# Patient Record
Sex: MALE | Race: WHITE | NOT HISPANIC OR LATINO | Employment: FULL TIME | URBAN - METROPOLITAN AREA
[De-identification: names, ages, dates, MRNs, and addresses within clinical notes are randomized per-mention and may not be internally consistent; named-entity substitution may affect disease eponyms.]

---

## 2017-03-14 RX ORDER — PROPRANOLOL HYDROCHLORIDE 10 MG/1
TABLET ORAL
Qty: 90 TABLET | Refills: 0 | Status: SHIPPED | OUTPATIENT
Start: 2017-03-14 | End: 2018-01-10 | Stop reason: SDUPTHER

## 2018-01-10 ENCOUNTER — OFFICE VISIT (OUTPATIENT)
Dept: FAMILY MEDICINE CLINIC | Facility: CLINIC | Age: 48
End: 2018-01-10

## 2018-01-10 VITALS
HEIGHT: 74 IN | BODY MASS INDEX: 25.93 KG/M2 | RESPIRATION RATE: 16 BRPM | OXYGEN SATURATION: 99 % | DIASTOLIC BLOOD PRESSURE: 80 MMHG | WEIGHT: 202 LBS | SYSTOLIC BLOOD PRESSURE: 110 MMHG | HEART RATE: 66 BPM

## 2018-01-10 DIAGNOSIS — F43.0 ACUTE SITUATIONAL DISTURBANCE: ICD-10-CM

## 2018-01-10 DIAGNOSIS — Z79.899 LONG TERM USE OF DRUG: Primary | ICD-10-CM

## 2018-01-10 DIAGNOSIS — R59.0 AXILLARY ADENOPATHY: ICD-10-CM

## 2018-01-10 PROCEDURE — 99213 OFFICE O/P EST LOW 20 MIN: CPT | Performed by: INTERNAL MEDICINE

## 2018-01-10 RX ORDER — PROPRANOLOL HYDROCHLORIDE 10 MG/1
10 TABLET ORAL DAILY
Qty: 90 TABLET | Refills: 0 | Status: SHIPPED | OUTPATIENT
Start: 2018-01-10 | End: 2018-04-11 | Stop reason: SDUPTHER

## 2018-01-10 RX ORDER — CEPHALEXIN 500 MG/1
CAPSULE ORAL
Refills: 0 | COMMUNITY
Start: 2017-12-22 | End: 2018-08-10

## 2018-01-11 PROBLEM — R59.0 AXILLARY ADENOPATHY: Status: ACTIVE | Noted: 2018-01-11

## 2018-01-11 LAB
ALBUMIN SERPL-MCNC: 4.8 G/DL (ref 3.5–5.2)
ALBUMIN/GLOB SERPL: 1.8 G/DL
ALP SERPL-CCNC: 62 U/L (ref 39–117)
ALT SERPL-CCNC: 31 U/L (ref 1–41)
AST SERPL-CCNC: 24 U/L (ref 1–40)
BASOPHILS # BLD AUTO: 0.05 10*3/MM3 (ref 0–0.2)
BASOPHILS NFR BLD AUTO: 0.8 % (ref 0–1.5)
BILIRUB SERPL-MCNC: 1.8 MG/DL (ref 0.1–1.2)
BUN SERPL-MCNC: 16 MG/DL (ref 6–20)
BUN/CREAT SERPL: 16.8 (ref 7–25)
CALCIUM SERPL-MCNC: 9.3 MG/DL (ref 8.6–10.5)
CHLORIDE SERPL-SCNC: 102 MMOL/L (ref 98–107)
CO2 SERPL-SCNC: 25.8 MMOL/L (ref 22–29)
CREAT SERPL-MCNC: 0.95 MG/DL (ref 0.76–1.27)
EOSINOPHIL # BLD AUTO: 0.13 10*3/MM3 (ref 0–0.7)
EOSINOPHIL NFR BLD AUTO: 2.1 % (ref 0.3–6.2)
ERYTHROCYTE [DISTWIDTH] IN BLOOD BY AUTOMATED COUNT: 13.4 % (ref 11.5–14.5)
GLOBULIN SER CALC-MCNC: 2.7 GM/DL
GLUCOSE SERPL-MCNC: 88 MG/DL (ref 65–99)
HCT VFR BLD AUTO: 46.1 % (ref 40.4–52.2)
HGB BLD-MCNC: 16 G/DL (ref 13.7–17.6)
IMM GRANULOCYTES # BLD: 0.02 10*3/MM3 (ref 0–0.03)
IMM GRANULOCYTES NFR BLD: 0.3 % (ref 0–0.5)
LYMPHOCYTES # BLD AUTO: 2.29 10*3/MM3 (ref 0.9–4.8)
LYMPHOCYTES NFR BLD AUTO: 37.8 % (ref 19.6–45.3)
MCH RBC QN AUTO: 32.7 PG (ref 27–32.7)
MCHC RBC AUTO-ENTMCNC: 34.7 G/DL (ref 32.6–36.4)
MCV RBC AUTO: 94.1 FL (ref 79.8–96.2)
MONOCYTES # BLD AUTO: 0.6 10*3/MM3 (ref 0.2–1.2)
MONOCYTES NFR BLD AUTO: 9.9 % (ref 5–12)
NEUTROPHILS # BLD AUTO: 2.97 10*3/MM3 (ref 1.9–8.1)
NEUTROPHILS NFR BLD AUTO: 49.1 % (ref 42.7–76)
PLATELET # BLD AUTO: 212 10*3/MM3 (ref 140–500)
POTASSIUM SERPL-SCNC: 3.9 MMOL/L (ref 3.5–5.2)
PROT SERPL-MCNC: 7.5 G/DL (ref 6–8.5)
RBC # BLD AUTO: 4.9 10*6/MM3 (ref 4.6–6)
SODIUM SERPL-SCNC: 143 MMOL/L (ref 136–145)
WBC # BLD AUTO: 6.06 10*3/MM3 (ref 4.5–10.7)

## 2018-01-14 NOTE — PROGRESS NOTES
Subjective   Navin Mensah is a 47 y.o. male. Patient is here today for   Chief Complaint   Patient presents with   • Arm Problem     left   • Immunizations     tdap          Vitals:    01/10/18 1547   BP: 110/80   Pulse: 66   Resp: 16   SpO2: 99%       Past Medical History:   Diagnosis Date   • Anxiety       No Known Allergies   Social History     Social History   • Marital status: Unknown     Spouse name: N/A   • Number of children: N/A   • Years of education: N/A     Occupational History   • Not on file.     Social History Main Topics   • Smoking status: Never Smoker   • Smokeless tobacco: Not on file   • Alcohol use Yes      Comment: social   • Drug use: Not on file   • Sexual activity: Not on file     Other Topics Concern   • Not on file     Social History Narrative   • No narrative on file        Current Outpatient Prescriptions:   •  cephalexin (KEFLEX) 500 MG capsule, TAKE 1 CAPSULE BY MOUTH 4 TIMES A DAY, Disp: , Rfl: 0  •  propranolol (INDERAL) 10 MG tablet, Take 1 tablet by mouth Daily., Disp: 90 tablet, Rfl: 0     Objective     HPI Comments: I haven't seen this patient for about 3 years.  He is a friend of mine younger brothers.  He noticed that he developed some right anterior cervical adenopathy couple weeks ago.  This is since resolved.  At the same time, he developed some left axillary nodule.  Neither of these nodules were painful.  As I said, his anterior cervical node resolved but his left axillary node remains.  It is smaller than it was initially.    He notes no history of sore throat, ear drainage, sinus infection, scalp or skin lesion.    He does not smoke cigarettes.  He does not have a cough.    He has a history of situational anxiety.  He finds Inderal 10 mg once daily when necessary useful for this issue.    Arm Problem          Review of Systems   Constitutional: Negative.    HENT: Negative.    Respiratory: Negative.    Cardiovascular: Negative.    Psychiatric/Behavioral: Negative.         Physical Exam   Constitutional: He is oriented to person, place, and time. He appears well-developed and well-nourished.   HENT:   Head: Normocephalic and atraumatic.   Neck:   He has no adenopathy.   Cardiovascular: Normal rate and regular rhythm.    Pulmonary/Chest: Effort normal.   Musculoskeletal:   He does have a nontender, fixed, 5 mm subcutaneous lymph node in the left axilla   Neurological: He is alert and oriented to person, place, and time.   Psychiatric: He has a normal mood and affect. His behavior is normal.   Nursing note and vitals reviewed.        Problem List Items Addressed This Visit        Immune and Lymphatic    Axillary adenopathy    Relevant Orders    CT Chest With Contrast       Other    Acute situational disturbance      Other Visit Diagnoses     Long term use of drug    -  Primary    Relevant Orders    CBC w AUTO Differential (Completed)    Comprehensive metabolic panel (Completed)            PLAN  I refilled his Inderal which she takes as needed for acute situational anxiety.    He has an axillary adenopathy, and I would like to be thorough in evaluating.  He appears to be quite healthy but I think it should get a CT scan of his chest with contrast to be certain.  No Follow-up on file.

## 2018-01-31 ENCOUNTER — TELEPHONE (OUTPATIENT)
Dept: FAMILY MEDICINE CLINIC | Facility: CLINIC | Age: 48
End: 2018-01-31

## 2018-04-11 RX ORDER — PROPRANOLOL HYDROCHLORIDE 10 MG/1
10 TABLET ORAL DAILY
Qty: 90 TABLET | Refills: 0 | Status: SHIPPED | OUTPATIENT
Start: 2018-04-11 | End: 2020-02-27 | Stop reason: SDUPTHER

## 2018-08-10 ENCOUNTER — OFFICE VISIT (OUTPATIENT)
Dept: FAMILY MEDICINE CLINIC | Facility: CLINIC | Age: 48
End: 2018-08-10

## 2018-08-10 VITALS
SYSTOLIC BLOOD PRESSURE: 110 MMHG | DIASTOLIC BLOOD PRESSURE: 72 MMHG | TEMPERATURE: 99.2 F | HEIGHT: 74 IN | OXYGEN SATURATION: 98 % | HEART RATE: 65 BPM | WEIGHT: 199.6 LBS | RESPIRATION RATE: 16 BRPM | BODY MASS INDEX: 25.62 KG/M2

## 2018-08-10 DIAGNOSIS — M25.551 RIGHT HIP PAIN: Primary | ICD-10-CM

## 2018-08-10 PROCEDURE — 99213 OFFICE O/P EST LOW 20 MIN: CPT | Performed by: INTERNAL MEDICINE

## 2018-08-10 NOTE — PROGRESS NOTES
Subjective   Navin Mensah is a 48 y.o. male. Patient is here today for   Chief Complaint   Patient presents with   • Hip Pain     pt states right hip pain, states jerked the wrong way in back yard about two month ago and hurt right hip           Vitals:    08/10/18 1038   BP: 110/72   Pulse: 65   Resp: 16   Temp: 99.2 °F (37.3 °C)   SpO2: 98%       Past Medical History:   Diagnosis Date   • Anxiety       No Known Allergies   Social History     Social History   • Marital status: Unknown     Spouse name: N/A   • Number of children: N/A   • Years of education: N/A     Occupational History   • Not on file.     Social History Main Topics   • Smoking status: Never Smoker   • Smokeless tobacco: Never Used   • Alcohol use Yes      Comment: social   • Drug use: Unknown   • Sexual activity: Not on file     Other Topics Concern   • Not on file     Social History Narrative   • No narrative on file        Current Outpatient Prescriptions:   •  propranolol (INDERAL) 10 MG tablet, TAKE 1 TABLET BY MOUTH DAILY., Disp: 90 tablet, Rfl: 0     Objective     He slipped without falling couple months ago.  Since then, he has had some right hip pain.  He denies any having lumbar spine pain.  He denies any radicular pain.      Hip Pain           Review of Systems   Constitutional: Negative.    HENT: Negative.    Respiratory: Negative.    Cardiovascular: Negative.    Musculoskeletal: Negative.    Psychiatric/Behavioral: Negative.        Physical Exam   Constitutional: He is oriented to person, place, and time. He appears well-developed and well-nourished.   HENT:   Head: Normocephalic and atraumatic.   Pulmonary/Chest: Effort normal.   Musculoskeletal:   He had a negative straight leg raising.  He had no pain on palpation of his lumbar sacral spine.    There is no pain on flexion of the right hip for with internal or external rotation.  There is no crepitus.   Neurological: He is alert and oriented to person, place, and time.    Psychiatric: He has a normal mood and affect. His behavior is normal.   Nursing note and vitals reviewed.        Problem List Items Addressed This Visit        Nervous and Auditory    Right hip pain - Primary    Relevant Orders    Ambulatory Referral to Physical Therapy Evaluate and treat            PLAN  I will see physical therapy evaluation and management    3.  I've referred him to physical therapy.    If he fails to improve, he will let me know.  No Follow-up on file.

## 2018-08-28 ENCOUNTER — HOSPITAL ENCOUNTER (OUTPATIENT)
Dept: PHYSICAL THERAPY | Facility: HOSPITAL | Age: 48
Setting detail: THERAPIES SERIES
Discharge: HOME | End: 2018-08-28

## 2018-08-28 DIAGNOSIS — M25.551 RIGHT HIP PAIN: Primary | ICD-10-CM

## 2018-08-28 PROCEDURE — 97110 THERAPEUTIC EXERCISES: CPT

## 2018-08-28 PROCEDURE — G8978 MOBILITY CURRENT STATUS: HCPCS

## 2018-08-28 PROCEDURE — G8979 MOBILITY GOAL STATUS: HCPCS

## 2018-08-28 PROCEDURE — 97161 PT EVAL LOW COMPLEX 20 MIN: CPT

## 2018-08-28 NOTE — THERAPY EVALUATION
"    Outpatient Physical Therapy Ortho Initial Evaluation  [facility]     Patient Name: Navin Mensah  : 1970  MRN: [id number]  Today's Date: 2018      Visit Date: 2018    Patient Active Problem List   Diagnosis   • Acute situational disturbance   • Axillary adenopathy   • Right hip pain        Past Medical History:   Diagnosis Date   • Anxiety         History reviewed. No pertinent surgical history.    Visit Dx:     ICD-10-CM ICD-9-CM   1. Right hip pain M25.551 719.45             Patient History     Row Name 18 1400             History    Chief Complaint Pain  -LS      Type of Pain Hip pain  -LS      Date Current Problem(s) Began 18  -LS      Brief Description of Current Complaint Pt reports slipping on mossy patio and throwing R hip way out to balance. He reports \"near dislocation.\" He rides bike daily and handles that without difficulty. His greatest pain is lying on R hip, walking for long periods of time, and large movements to straddle bike.  -LS      Previous treatment for THIS PROBLEM Other (comment)   exercises  -LS      Patient/Caregiver Goals Relieve pain;Know what to do to help the symptoms  -LS      Occupation/sports/leisure activities Pt bikes for exercise. Pt works at a desk job.  -LS      Patient seeing anyone else for problem(s)? yes  -LS      How has patient tried to help current problem? some strengthening exercises  -LS      History of Previous Related Injuries pt denies prior hip injury  -LS         Pain     Pain Location Hip  -LS      Pain at Present 3  -LS      Pain at Best 0  -LS      Pain at Worst 3  -LS      Pain Frequency Intermittent  -LS      Pain Description Dull;Discomfort;Aching  -LS      What Performance Factors Make the Current Problem(s) WORSE? walking 2-3 miles, straddling bike initially, laying on R hip  -LS      What Performance Factors Make the Current Problem(s) BETTER? rest, sitting  -LS      Tolerance Time- Standing no issue  -LS      " Tolerance Time- Sitting no issue   -LS      Tolerance Time- Walking 2-3 miles  -LS      Tolerance Time- Lying pain laying on R  -LS      Is your sleep disturbed? No  -LS      What position do you sleep in? Supine;Left sidelying  -LS      Difficulties at work? Able to perform, seated desk job  -LS      Difficulties with ADL's? Able to perform  -LS      Difficulties with recreational activities? Pain with prolonged walking.  -LS         Fall Risk Assessment    Any falls in the past year: Yes  -LS         Services    Prior Rehab/Home Health Experiences No  -LS      Are you currently receiving Home Health services No  -LS      Do you plan to receive Home Health services in the near future No  -LS         Daily Activities    Primary Language English  -LS      Pt Participated in POC and Goals Yes  -LS         Safety    Are you being hurt, hit, or frightened by anyone at home or in your life? No  -LS      Are you being neglected by a caregiver No  -LS        User Key  (r) = Recorded By, (t) = Taken By, (c) = Cosigned By    Initials Name Provider Type    LS Paula Silvestre, PT Physical Therapist                PT Ortho     Row Name 08/28/18 1500       Quarter Clearing    Quarter Clearing --   BLE 5/5   -LS       Hip/Thigh Palpation    Greater Trochanter Right:;Tender  -LS    Gluteus Enzo Right:;Guarded/taut  -LS    Gluteus Medius Right:;Guarded/taut  -LS    Gluteus Minimus Right:;Guarded/taut  -LS       Hip Special Tests    JESSICA (hip vs SI pathology) Right:;Positive   reports slight discomfort in R hip  -LS    Nick’s test (tightness of ITB) Right:;Positive  -LS    Hip scour test (labral vs hip pathology) Negative  -LS    Piriformis test (piriformis syndrome) Negative  -LS       General ROM    GENERAL ROM COMMENTS BLE WFL  -LS       MMT (Manual Muscle Testing)    General MMT Comments R hip abduction/extension 4/5, R hip ER 4+/5  -LS       Sensation    Sensation WNL? WNL  -LS       Lower Extremity Flexibility    Hamstrings  Bilateral:;Mildly limited  -LS    Hip External Rotators Right:;Mildly limited  -LS    Hip Internal Rotators WNL  -LS    Gastrocnemius Bilateral:;Mildly limited  -LS       Gait/Stairs Assessment/Training    [place] Level (Gait) independent  -LS      User Key  (r) = Recorded By, (t) = Taken By, (c) = Cosigned By    Initials Name Provider Type    Paula Shelton, PT Physical Therapist                      Therapy Education  Education Details: issued HEP, discussed avoidance of long walks to point of pain, use of ice, intermittent stretching, post activity stretching, lateral strengthening   Given: HEP, Symptoms/condition management, Posture/body mechanics  Program: Reinforced  How Provided: Verbal, Demonstration, Written  Provided to: Patient  Level of Understanding: Teach back education performed, Verbalized, Demonstrated           PT OP Goals     Row Name 08/28/18 1500          PT Short Term Goals    STG Date to Achieve 09/11/18  -LS     STG 1 Pt will demonstrate understanding and compliance with initial HEP.  -LS     STG 1 Progress New  -LS     STG 2 Pt will report reduced long distance walking with reduction of aggravating R hip activities by 50%.  -LS     STG 2 Progress New  -LS        Long Term Goals    LTG Date to Achieve 09/27/18  -LS     LTG 1 Pt will demonstrate 5/5 R hip abduction, extension, ER strength.  -LS     LTG 1 Progress New  -LS     LTG 2 Pt will report full return to recreational activities without R hip discomfort.   -LS     LTG 2 Progress New  -LS        Time Calculation    PT Goal Re-Cert Due Date 11/26/18  -LS       User Key  (r) = Recorded By, (t) = Taken By, (c) = Cosigned By    Initials Name Provider Type    Paula Shelton, PT Physical Therapist                PT Assessment/Plan     Row Name 08/28/18 1514          PT Assessment    Functional Limitations Performance in leisure activities;Impaired gait;Limitations in functional capacity and performance;Impaired locomotion  -      Impairments Pain;Gait;Impaired flexibility  -LS     Assessment Comments Pt is 49 yo male with evolving R hip pain at lateral greater trochanter caused by slipping on patio resulting in awkward lateral movement without fall. He demonstrates slight deficits in R hip abduction (4/5) and external rotation (4+/5) strength. His R hip ROM is WFL. Tautness and trigger points noted in R piriformis and glute max. He has greatest pain with inc time spent walking and extreme combination movements of R hip. R hip scour testing negative and pt denies radicular symptoms. He demonstrates signs and symptoms consistent with R greater trochanter bursitis. His self reported LEFS disability score is 15% disability. Personal factors affecting his care include continued 10 miles biking daily and tendency to push through pain. He will benefit from continued skilled PT services to improve R hip flexibility and strengthen lateral and posterior hip musculature.  -LS     Please refer to paper survey for additional self-reported information Yes  -LS     Rehab Potential Good  -LS     Patient/caregiver participated in establishment of treatment plan and goals Yes  -LS     Patient would benefit from skilled therapy intervention Yes  -LS        PT Plan    PT Frequency 1x/week  -LS     Predicted Duration of Therapy Intervention (Therapy Eval) 3 weeks   -LS     Planned CPT's? PT EVAL LOW COMPLEXITY: 67604;PT THER PROC EA 15 MIN: 33741;PT RE-EVAL: 92798;PT THER ACT EA 15 MIN: 57681;PT MANUAL THERAPY EA 15 MIN: 05735;PT HOT OR COLD PACK TREAT MCARE;PT HOT/COLD PACK WC NONMCARE: 67823  -LS     PT Plan Comments Evaluate tolerance to HEP, progress as needed.   -LS       User Key  (r) = Recorded By, (t) = Taken By, (c) = Cosigned By    Initials Name Provider Type    Paula Shelton, PT Physical Therapist                  Exercises     Row Name 08/28/18 1500             Subjective Comments    Subjective Comments I feel it after I walk for 2-3 miles but  after I bike I have no issue.  -LS         Subjective Pain    Able to rate subjective pain? yes  -LS      Pre-Treatment Pain Level 3  -LS         Total Minutes    98750 - PT Therapeutic Exercise Minutes 15  -LS         Exercise 1    Exercise Name 1 piriformis stretch  -LS      Reps 1 3  -LS      Time 1 20  -LS         Exercise 2    Exercise Name 2 hip bridge  -LS      Reps 2 10  -LS      Time 2 3  -LS         Exercise 3    Exercise Name 3 SL hip abduction  -LS      Sets 3 2  -LS      Reps 3 10  -LS         Exercise 4    Exercise Name 4 SL clamshell  -LS      Sets 4 2  -LS      Reps 4 10  -LS         Exercise 5    Exercise Name 5 stair HS stretch  -LS      Reps 5 3  -LS      Time 5 20  -LS        User Key  (r) = Recorded By, (t) = Taken By, (c) = Cosigned By    Initials Name Provider Type    Paula Shelton, PT Physical Therapist                        Outcome Measure Options: Lower Extremity Functional Scale (LEFS)  Lower Extremity Functional Index  Any of your usual work, housework or school activities: No difficulty  Your usual hobbies, recreational or sporting activities: A little bit of difficulty  Getting into or out of the bath: No difficulty  Walking between rooms: No difficulty  Putting on your shoes or socks: No difficulty  Squatting: No difficulty  Lifting an object, like a bag of groceries from the floor: No difficulty  Performing light activities around your home: No difficulty  Performing heavy activities around your home: A little bit of difficulty  Getting into or out of a car: No difficulty  Walking 2 blocks: No difficulty  Walking a mile: A little bit of difficulty  Going up or down 10 stairs (about 1 flight of stairs): No difficulty  Standing for 1 hour: No difficulty  Sitting for 1 hour: No difficulty  Running on even ground: A little bit of difficulty  Running on uneven ground: A little bit of difficulty  Making sharp turns while running fast: A little bit of difficulty  Hopping: No  difficulty  Rolling over in bed: A little bit of difficulty  Total: 73      Time Calculation:     Therapy Suggested Charges     Code   Minutes Charges    79402 (CPT®) Hc Pt Neuromusc Re Education Ea 15 Min      49336 (CPT®) Hc Pt Ther Proc Ea 15 Min 15 1    85508 (CPT®) Hc Gait Training Ea 15 Min      69219 (CPT®) Hc Pt Therapeutic Act Ea 15 Min      49665 (CPT®) Hc Pt Manual Therapy Ea 15 Min      71171 (CPT®) Hc Pt Ther Massage- Per 15 Min      62204 (CPT®) Hc Pt Iontophoresis Ea 15 Min      52922 (CPT®) Hc Pt Elec Stim Ea-Per 15 Min      61518 (CPT®) Hc Pt Ultrasound Ea 15 Min      82607 (CPT®) Hc Pt Self Care/Mgmt/Train Ea 15 Min      75860 (CPT®) Hc Pt Prosthetic (S) Train Initial Encounter, Each 15 Min      04001 (CPT®) Hc Orthotic(S) Mgmt/Train Initial Encounter, Each 15min      32893 (CPT®) Hc Pt Aquatic Therapy Ea 15 Min      98069 (CPT®) Hc Pt Orthotic(S)/Prosthetic(S) Encounter, Each 15 Min      Total  15 1          Start Time: 1430  Stop Time: 1515  Time Calculation (min): 45 min  Total Timed Code Minutes- PT: 15 minute(s)     Therapy Charges for Today     Code Description Service Date Service Provider Modifiers Qty    86941233858 HC PT MOBILITY CURRENT 8/28/2018 Paula Silvestre, PT GP, CI 1    50440216070 HC PT MOBILITY PROJECTED 8/28/2018 Paula Silvestre, PT GP, CH 1    56221185214 HC PT THER PROC EA 15 MIN 8/28/2018 Paula Silvestre, PT GP 1    08856976064 HC PT EVAL LOW COMPLEXITY 2 8/28/2018 Paula Silvestre, PT GP 1          PT G-Codes  PT Professional Judgement Used?: Yes  Outcome Measure Options: Lower Extremity Functional Scale (LEFS)  Score: 8% disability   Functional Limitation: Mobility: Walking and moving around  Mobility: Walking and Moving Around Current Status (): At least 1 percent but less than 20 percent impaired, limited or restricted  Mobility: Walking and Moving Around Goal Status (): 0 percent impaired, limited or restricted         Paula Silvestre PT  8/28/2018

## 2018-08-31 DIAGNOSIS — Z00.00 ROUTINE HEALTH MAINTENANCE: Primary | ICD-10-CM

## 2018-09-10 ENCOUNTER — CLINICAL SUPPORT (OUTPATIENT)
Dept: FAMILY MEDICINE CLINIC | Facility: CLINIC | Age: 48
End: 2018-09-10

## 2018-09-10 DIAGNOSIS — Z00.00 ROUTINE HEALTH MAINTENANCE: Primary | ICD-10-CM

## 2018-09-10 LAB
ALBUMIN SERPL-MCNC: 4.9 G/DL (ref 3.5–5.2)
ALBUMIN/GLOB SERPL: 2 G/DL
ALP SERPL-CCNC: 60 U/L (ref 39–117)
ALT SERPL-CCNC: 27 U/L (ref 1–41)
APPEARANCE UR: CLEAR
AST SERPL-CCNC: 14 U/L (ref 1–40)
BILIRUB SERPL-MCNC: 1.1 MG/DL (ref 0.1–1.2)
BILIRUB UR QL STRIP: NEGATIVE
BUN SERPL-MCNC: 15 MG/DL (ref 6–20)
BUN/CREAT SERPL: 17.2 (ref 7–25)
CALCIUM SERPL-MCNC: 9.4 MG/DL (ref 8.6–10.5)
CHLORIDE SERPL-SCNC: 102 MMOL/L (ref 98–107)
CHOLEST SERPL-MCNC: 246 MG/DL (ref 0–200)
CO2 SERPL-SCNC: 28.2 MMOL/L (ref 22–29)
COLOR UR: YELLOW
CREAT SERPL-MCNC: 0.87 MG/DL (ref 0.76–1.27)
GLOBULIN SER CALC-MCNC: 2.4 GM/DL
GLUCOSE SERPL-MCNC: 91 MG/DL (ref 65–99)
GLUCOSE UR QL: NEGATIVE
HDLC SERPL-MCNC: 58 MG/DL (ref 40–60)
HGB UR QL STRIP: NEGATIVE
KETONES UR QL STRIP: NEGATIVE
LDLC SERPL CALC-MCNC: 155 MG/DL (ref 0–100)
LDLC/HDLC SERPL: 2.67 {RATIO}
LEUKOCYTE ESTERASE UR QL STRIP: NEGATIVE
NITRITE UR QL STRIP: NEGATIVE
PH UR STRIP: 6 [PH] (ref 5–8)
POTASSIUM SERPL-SCNC: 3.9 MMOL/L (ref 3.5–5.2)
PROT SERPL-MCNC: 7.3 G/DL (ref 6–8.5)
PROT UR QL STRIP: NEGATIVE
SODIUM SERPL-SCNC: 141 MMOL/L (ref 136–145)
SP GR UR: 1.01 (ref 1–1.03)
TRIGL SERPL-MCNC: 166 MG/DL (ref 0–150)
UROBILINOGEN UR STRIP-MCNC: NORMAL MG/DL
VLDLC SERPL CALC-MCNC: 33.2 MG/DL (ref 5–40)

## 2018-09-10 PROCEDURE — 85025 COMPLETE CBC W/AUTO DIFF WBC: CPT | Performed by: INTERNAL MEDICINE

## 2018-09-10 PROCEDURE — 71046 X-RAY EXAM CHEST 2 VIEWS: CPT | Performed by: INTERNAL MEDICINE

## 2018-09-10 PROCEDURE — 93000 ELECTROCARDIOGRAM COMPLETE: CPT | Performed by: INTERNAL MEDICINE

## 2018-09-21 ENCOUNTER — APPOINTMENT (OUTPATIENT)
Dept: PHYSICAL THERAPY | Facility: HOSPITAL | Age: 48
End: 2018-09-21

## 2018-10-15 ENCOUNTER — DOCUMENTATION (OUTPATIENT)
Dept: PHYSICAL THERAPY | Facility: HOSPITAL | Age: 48
End: 2018-10-15

## 2018-10-15 NOTE — THERAPY DISCHARGE NOTE
Outpatient Physical Therapy Discharge Summary         Patient Name: Navin Mensah  : 1970  MRN: [id number]    Today's Date: 10/15/2018    Visit Dx:  No diagnosis found.          PT OP Goals     Row Name 10/15/18 1500          PT Short Term Goals    STG Date to Achieve 18  -LS     STG 1 Pt will demonstrate understanding and compliance with initial HEP.  -LS     STG 1 Progress Not Met  -LS     STG 1 Progress Comments Pt did not return for follow up after evaluation. Unable to further assess.  -LS     STG 2 Pt will report reduced long distance walking with reduction of aggravating R hip activities by 50%.  -LS     STG 2 Progress Not Met  -LS     STG 2 Progress Comments Pt did not return for follow up after evaluation. Unable to further assess.  -LS        Long Term Goals    LTG Date to Achieve 18  -LS     LTG 1 Pt will demonstrate 5/5 R hip abduction, extension, ER strength.  -LS     LTG 1 Progress Not Met  -LS     LTG 1 Progress Comments Pt did not return for follow up after evaluation. Unable to further assess.  -LS     LTG 2 Pt will report full return to recreational activities without R hip discomfort.   -LS     LTG 2 Progress Not Met  -LS     LTG 2 Progress Comments Pt did not return for follow up after evaluation. Unable to further assess.  -LS       User Key  (r) = Recorded By, (t) = Taken By, (c) = Cosigned By    Initials Name Provider Type    Paula Shelton, PT Physical Therapist          OP PT Discharge Summary  Date of Discharge: 10/15/18  Reason for Discharge: Non-compliant  Outcomes Achieved: Patient able to partially acheive established goals  Discharge Destination: Home with home program  Discharge Instructions/Additional Comments: Pt did not return for follow up after evaluation. Unable to further assess.      Time Calculation:        Therapy Suggested Charges     Code   Minutes Charges    None                       Paula Silvestre PT  10/15/2018

## 2018-11-05 ENCOUNTER — OFFICE VISIT (OUTPATIENT)
Dept: FAMILY MEDICINE CLINIC | Facility: CLINIC | Age: 48
End: 2018-11-05

## 2018-11-05 VITALS
TEMPERATURE: 98.2 F | SYSTOLIC BLOOD PRESSURE: 110 MMHG | OXYGEN SATURATION: 99 % | DIASTOLIC BLOOD PRESSURE: 61 MMHG | BODY MASS INDEX: 25.57 KG/M2 | WEIGHT: 199.2 LBS | RESPIRATION RATE: 16 BRPM | HEIGHT: 74 IN | HEART RATE: 72 BPM

## 2018-11-05 DIAGNOSIS — Z00.00 ROUTINE HEALTH MAINTENANCE: Primary | ICD-10-CM

## 2018-11-05 DIAGNOSIS — Z23 NEED FOR VACCINATION: ICD-10-CM

## 2018-11-05 DIAGNOSIS — F43.0 ACUTE SITUATIONAL DISTURBANCE: ICD-10-CM

## 2018-11-05 PROCEDURE — 71046 X-RAY EXAM CHEST 2 VIEWS: CPT | Performed by: INTERNAL MEDICINE

## 2018-11-05 PROCEDURE — 90471 IMMUNIZATION ADMIN: CPT | Performed by: INTERNAL MEDICINE

## 2018-11-05 PROCEDURE — 99396 PREV VISIT EST AGE 40-64: CPT | Performed by: INTERNAL MEDICINE

## 2018-11-05 PROCEDURE — 90715 TDAP VACCINE 7 YRS/> IM: CPT | Performed by: INTERNAL MEDICINE

## 2018-11-18 PROBLEM — Z00.00 ROUTINE HEALTH MAINTENANCE: Status: ACTIVE | Noted: 2018-11-18

## 2018-11-18 NOTE — PROGRESS NOTES
Subjective   Navin Mensah is a 48 y.o. male. Patient is here today for   Chief Complaint   Patient presents with   • Annual Exam     CPE           Vitals:    11/05/18 1101   BP: 110/61   Pulse: 72   Resp: 16   Temp: 98.2 °F (36.8 °C)   SpO2: 99%       The following portions of the patient's history were reviewed and updated as appropriate: allergies, current medications, past family history, past medical history, past social history, past surgical history and problem list.    Past Medical History:   Diagnosis Date   • Anxiety       No Known Allergies   Social History     Socioeconomic History   • Marital status: Unknown     Spouse name: Not on file   • Number of children: Not on file   • Years of education: Not on file   • Highest education level: Not on file   Social Needs   • Financial resource strain: Not on file   • Food insecurity - worry: Not on file   • Food insecurity - inability: Not on file   • Transportation needs - medical: Not on file   • Transportation needs - non-medical: Not on file   Occupational History   • Not on file   Tobacco Use   • Smoking status: Never Smoker   • Smokeless tobacco: Never Used   Substance and Sexual Activity   • Alcohol use: Yes     Comment: social   • Drug use: Not on file   • Sexual activity: Not on file   Other Topics Concern   • Not on file   Social History Narrative   • Not on file        Current Outpatient Medications:   •  propranolol (INDERAL) 10 MG tablet, TAKE 1 TABLET BY MOUTH DAILY., Disp: 90 tablet, Rfl: 0     EKG  ECG Report  Electrocardiogram September 10, 2018 showed normal sinus rhythm.  This was a normal EKG.    PA and lateral chest x-ray and same date showed no acute or chronic changes.  Objective   This pleasant 48-year-old male is here for a comprehensive physical exam.    He has no complaints.       Navin Mensah 48 y.o. male who presents for an Annual Wellness Visit.  he has a history of   Patient Active Problem List   Diagnosis   • Acute situational  disturbance   • Axillary adenopathy   • Right hip pain   • Routine health maintenance   .  he has been doing well with new interval problems.  Labs results discussed in detail with the patient.  Plan to update vaccines if needed today.        Lab Results (most recent)     None            Review of Systems   Constitutional: Negative.    HENT: Negative.    Eyes: Negative.    Respiratory: Negative.    Cardiovascular: Negative.    Gastrointestinal: Negative.    Endocrine: Negative.    Genitourinary: Negative.    Musculoskeletal: Negative.    Skin: Negative.    Allergic/Immunologic: Negative.    Neurological: Negative.    Hematological: Negative.    Psychiatric/Behavioral: Negative.        Physical Exam   Constitutional: He is oriented to person, place, and time. He appears well-developed and well-nourished. No distress.   Pleasant, neatly groomed, BMI 25.   HENT:   Head: Normocephalic and atraumatic.   Right Ear: External ear normal.   Left Ear: External ear normal.   Nose: Nose normal.   Mouth/Throat: Oropharynx is clear and moist. No oropharyngeal exudate.   Eyes: Conjunctivae and EOM are normal. Pupils are equal, round, and reactive to light. Right eye exhibits no discharge. Left eye exhibits no discharge. No scleral icterus.   Neck: Normal range of motion. Neck supple. No JVD present. No tracheal deviation present. No thyromegaly present.   Cardiovascular: Normal rate, regular rhythm, normal heart sounds and intact distal pulses. Exam reveals no gallop and no friction rub.   No murmur heard.  Pulmonary/Chest: Effort normal and breath sounds normal. No stridor. No respiratory distress. He has no wheezes. He has no rales. He exhibits no tenderness.   Abdominal: Soft. Bowel sounds are normal. He exhibits no distension and no mass. There is no tenderness. There is no rebound and no guarding. No hernia.   Genitourinary: Rectum normal, prostate normal and penis normal. Rectal exam shows guaiac negative stool. No penile  tenderness.   Musculoskeletal: Normal range of motion. He exhibits no edema, tenderness or deformity.   Lymphadenopathy:     He has no cervical adenopathy.   Neurological: He is alert and oriented to person, place, and time. He displays normal reflexes. No cranial nerve deficit or sensory deficit. He exhibits normal muscle tone. Coordination normal.   Skin: Skin is warm and dry. Capillary refill takes less than 2 seconds. No rash noted. He is not diaphoretic. No erythema. No pallor.   Psychiatric: He has a normal mood and affect. His behavior is normal. Judgment and thought content normal.   Nursing note and vitals reviewed.      ASSESSMENT       Problem List Items Addressed This Visit        Other    Acute situational disturbance    Routine health maintenance - Primary    Relevant Orders    XR Chest PA & Lateral (Completed)      Other Visit Diagnoses     Need for vaccination        Relevant Orders    Tdap Vaccine Greater Than or Equal To 6yo IM (Completed)          PLAN  He has been propranolol to be useful for situational anxiety refilled that medication today.    Overall he is very fit.    His cholesterols bit elevated.  His LDL cholesterol really ought to be less than 1:30 given his level of risk.  I recommended that he bruises [email or web address] website and follow advice on lower cholesterol that he may find that site.    Unlike him back in about a year to recheck his labs.  Also, he will be due for a comprehensive physical exam at that time.  There are no Patient Instructions on file for this visit.    No Follow-up on file.

## 2020-02-10 RX ORDER — PROPRANOLOL HYDROCHLORIDE 10 MG/1
10 TABLET ORAL DAILY
Qty: 90 TABLET | Refills: 0 | OUTPATIENT
Start: 2020-02-10

## 2020-02-27 ENCOUNTER — OFFICE VISIT (OUTPATIENT)
Dept: FAMILY MEDICINE CLINIC | Facility: CLINIC | Age: 50
End: 2020-02-27

## 2020-02-27 VITALS
SYSTOLIC BLOOD PRESSURE: 104 MMHG | HEIGHT: 74 IN | WEIGHT: 204.4 LBS | TEMPERATURE: 97.5 F | BODY MASS INDEX: 26.23 KG/M2 | HEART RATE: 69 BPM | DIASTOLIC BLOOD PRESSURE: 66 MMHG | OXYGEN SATURATION: 98 %

## 2020-02-27 DIAGNOSIS — F41.8 SITUATIONAL ANXIETY: Primary | ICD-10-CM

## 2020-02-27 PROCEDURE — 99213 OFFICE O/P EST LOW 20 MIN: CPT | Performed by: INTERNAL MEDICINE

## 2020-02-27 RX ORDER — PROPRANOLOL HYDROCHLORIDE 10 MG/1
10 TABLET ORAL DAILY
Qty: 90 TABLET | Refills: 1 | Status: SHIPPED | OUTPATIENT
Start: 2020-02-27

## 2020-03-08 PROBLEM — F41.8 SITUATIONAL ANXIETY: Status: ACTIVE | Noted: 2020-03-08

## 2020-03-08 NOTE — PROGRESS NOTES
Subjective   Navin Mensah is a 49 y.o. male. Patient is here today for   Chief Complaint   Patient presents with   • Anxiety          Vitals:    02/27/20 1510   BP: 104/66   Pulse: 69   Temp: 97.5 °F (36.4 °C)   SpO2: 98%     Body mass index is 26.24 kg/m².      Past Medical History:   Diagnosis Date   • Anxiety       No Known Allergies   Social History     Socioeconomic History   • Marital status: Unknown     Spouse name: Not on file   • Number of children: Not on file   • Years of education: Not on file   • Highest education level: Not on file   Tobacco Use   • Smoking status: Never Smoker   • Smokeless tobacco: Never Used   Substance and Sexual Activity   • Alcohol use: Yes     Comment: social        Current Outpatient Medications:   •  propranolol (INDERAL) 10 MG tablet, Take 1 tablet by mouth Daily., Disp: 90 tablet, Rfl: 1     Objective     This pleasant patient has situational anxiety.  Propranolol works well to alleviate this symptom when needed.  He is here to get a prescription for propranolol.    Otherwise, no complaints.       Review of Systems   Constitutional: Negative.    HENT: Negative.    Respiratory: Negative.    Cardiovascular: Negative.    Musculoskeletal: Negative.    Psychiatric/Behavioral:        He has situational anxiety his symptoms are largely alleviated with the use of propranolol as needed.       Physical Exam   Constitutional: He is oriented to person, place, and time. He appears well-developed and well-nourished.   Pleasant, neatly groomed, BMI 26.   HENT:   Head: Normocephalic and atraumatic.   Cardiovascular: Normal rate.   Pulmonary/Chest: Effort normal and breath sounds normal.   Neurological: He is alert and oriented to person, place, and time.   Psychiatric: He has a normal mood and affect. His behavior is normal. Thought content normal.   Nursing note and vitals reviewed.        Problem List Items Addressed This Visit        Other    Situational anxiety - Primary             PLAN  I refilled his propranolol.    I asked him to follow-up for a comprehensive physical examination once yearly.  No follow-ups on file.

## 2020-11-10 NOTE — TELEPHONE ENCOUNTER
I called patient and notified him, per Dr. Brown, that his labs were normal.   Patient understood.         ----- Message from Harmony Wagner sent at 1/31/2018 10:20 AM EST -----  WANTED TO KNOW WHAT HIS LAB RESULTS WERE. HE SAID HE WAS SUPPOSED TO GET A CALL.    PT# [phone]     pre op: palpable R DP pulse, biphasic PT signal. Palpable L PT pulse, triphasic DP signal.

## 2021-03-04 DIAGNOSIS — Z12.5 ENCOUNTER FOR SCREENING FOR MALIGNANT NEOPLASM OF PROSTATE: ICD-10-CM

## 2021-03-04 DIAGNOSIS — Z13.1 ENCOUNTER FOR SCREENING FOR DIABETES MELLITUS: ICD-10-CM

## 2021-03-04 DIAGNOSIS — Z13.89 ENCOUNTER FOR SCREENING FOR OTHER DISORDER: Primary | ICD-10-CM

## 2021-03-04 DIAGNOSIS — Z13.220 ENCOUNTER FOR SCREENING FOR LIPID DISORDER: ICD-10-CM

## 2021-03-04 DIAGNOSIS — Z13.228 ENCOUNTER FOR SCREENING FOR METABOLIC DISORDER: ICD-10-CM

## 2021-03-12 ENCOUNTER — IMMUNIZATION (OUTPATIENT)
Dept: VACCINE CLINIC | Facility: HOSPITAL | Age: 51
End: 2021-03-12

## 2021-03-12 PROCEDURE — 91300 HC SARSCOV02 VAC 30MCG/0.3ML IM: CPT | Performed by: INTERNAL MEDICINE

## 2021-03-12 PROCEDURE — 0001A: CPT | Performed by: INTERNAL MEDICINE

## 2021-03-25 ENCOUNTER — TELEPHONE (OUTPATIENT)
Dept: FAMILY MEDICINE CLINIC | Facility: CLINIC | Age: 51
End: 2021-03-25

## 2021-03-25 NOTE — TELEPHONE ENCOUNTER
DELETE AFTER REVIEWING: Telephone encounter to be sent to the clinical pool     Caller: Navin Mensah III    Relationship to patient: Self    Best call back number: [phone]     Date of exposure:     Date of positive COVID19 test:03/25  Date if possible COVID19 exposure:     COVID19 symptoms: FEVER, RAW THROAT , FEVER IS BETTER TODAY     Date of initial quarantine:    Additional information or concerns:  What is the patients preferred pharmacy: WOULD LIKE TO KNOW IF THERE IS ANYTHING HE SHOULD BE DOING EVEN THOUGH HIS SYMPTOMS ARE FEW.

## 2021-03-25 NOTE — TELEPHONE ENCOUNTER
Date of positive COVID19 test:03/25  Date if possible COVID19 exposure:      COVID19 symptoms: FEVER, RAW THROAT , FEVER IS BETTER TODAY      PATIENT WOULD LIKE TO KNOW IF THERE IS ANYTHING HE SHOULD BE DOING EVEN THOUGH HIS SYMPTOMS ARE FEW.       LAST SEEN 2/27/21 UPCOMING 4/9/21

## 2021-03-25 NOTE — TELEPHONE ENCOUNTER
I INFORMED PATIENT THERE IS NO MEDICINE FOR COVID HE NEEDS TO REST USE TYLENOL OR IBUPROFEN WARM SALT WATER AND DRINK PLENTY OF WATER.

## 2021-03-31 ENCOUNTER — TELEPHONE (OUTPATIENT)
Dept: FAMILY MEDICINE CLINIC | Facility: CLINIC | Age: 51
End: 2021-03-31

## 2021-03-31 NOTE — TELEPHONE ENCOUNTER
PATIENT CALLED IN WANTING TO KNOW IF HE SHOULD HE COME TO HIS APPT, HE SAYS IT HAS BEEN TEN DAYS SINCE DIAGNOSED WITH THE COVID. PATIENT ALSO WANTS TO KNOW IF HE SHOULD GET THE SECOND DOSE OF THE COVID VACCINE.    BEST CALL BACK # [phone]

## 2021-04-02 ENCOUNTER — APPOINTMENT (OUTPATIENT)
Dept: VACCINE CLINIC | Facility: HOSPITAL | Age: 51
End: 2021-04-02

## 2021-04-15 LAB
ALBUMIN SERPL-MCNC: 4.4 G/DL (ref 3.5–5.2)
ALBUMIN/GLOB SERPL: 2.1 G/DL
ALP SERPL-CCNC: 56 U/L (ref 39–117)
ALT SERPL-CCNC: 18 U/L (ref 1–41)
APPEARANCE UR: CLEAR
AST SERPL-CCNC: 16 U/L (ref 1–40)
BACTERIA #/AREA URNS HPF: NORMAL /HPF
BASOPHILS # BLD AUTO: 0.04 10*3/MM3 (ref 0–0.2)
BASOPHILS NFR BLD AUTO: 0.7 % (ref 0–1.5)
BILIRUB SERPL-MCNC: 1.1 MG/DL (ref 0–1.2)
BILIRUB UR QL STRIP: NEGATIVE
BUN SERPL-MCNC: 14 MG/DL (ref 6–20)
BUN/CREAT SERPL: 17.1 (ref 7–25)
CALCIUM SERPL-MCNC: 9.2 MG/DL (ref 8.6–10.5)
CASTS URNS MICRO: NORMAL
CHLORIDE SERPL-SCNC: 105 MMOL/L (ref 98–107)
CHOLEST SERPL-MCNC: 186 MG/DL (ref 0–200)
CO2 SERPL-SCNC: 29.8 MMOL/L (ref 22–29)
COLOR UR: YELLOW
CREAT SERPL-MCNC: 0.82 MG/DL (ref 0.76–1.27)
EOSINOPHIL # BLD AUTO: 0.08 10*3/MM3 (ref 0–0.4)
EOSINOPHIL NFR BLD AUTO: 1.5 % (ref 0.3–6.2)
EPI CELLS #/AREA URNS HPF: NORMAL /HPF
ERYTHROCYTE [DISTWIDTH] IN BLOOD BY AUTOMATED COUNT: 13.3 % (ref 12.3–15.4)
GLOBULIN SER CALC-MCNC: 2.1 GM/DL
GLUCOSE SERPL-MCNC: 88 MG/DL (ref 65–99)
GLUCOSE UR QL: NEGATIVE
HBA1C MFR BLD: 5 % (ref 4.8–5.6)
HCT VFR BLD AUTO: 41.6 % (ref 37.5–51)
HDLC SERPL-MCNC: 48 MG/DL (ref 40–60)
HGB BLD-MCNC: 14.9 G/DL (ref 13–17.7)
HGB UR QL STRIP: NEGATIVE
IMM GRANULOCYTES # BLD AUTO: 0.02 10*3/MM3 (ref 0–0.05)
IMM GRANULOCYTES NFR BLD AUTO: 0.4 % (ref 0–0.5)
KETONES UR QL STRIP: NEGATIVE
LDLC SERPL CALC-MCNC: 117 MG/DL (ref 0–100)
LDLC/HDLC SERPL: 2.39 {RATIO}
LEUKOCYTE ESTERASE UR QL STRIP: NEGATIVE
LYMPHOCYTES # BLD AUTO: 1.83 10*3/MM3 (ref 0.7–3.1)
LYMPHOCYTES NFR BLD AUTO: 33.7 % (ref 19.6–45.3)
MCH RBC QN AUTO: 33 PG (ref 26.6–33)
MCHC RBC AUTO-ENTMCNC: 35.8 G/DL (ref 31.5–35.7)
MCV RBC AUTO: 92.2 FL (ref 79–97)
MONOCYTES # BLD AUTO: 0.5 10*3/MM3 (ref 0.1–0.9)
MONOCYTES NFR BLD AUTO: 9.2 % (ref 5–12)
NEUTROPHILS # BLD AUTO: 2.96 10*3/MM3 (ref 1.7–7)
NEUTROPHILS NFR BLD AUTO: 54.5 % (ref 42.7–76)
NITRITE UR QL STRIP: NEGATIVE
NRBC BLD AUTO-RTO: 0 /100 WBC (ref 0–0.2)
PH UR STRIP: 6.5 [PH] (ref 5–8)
PLATELET # BLD AUTO: 213 10*3/MM3 (ref 140–450)
POTASSIUM SERPL-SCNC: 3.9 MMOL/L (ref 3.5–5.2)
PROT SERPL-MCNC: 6.5 G/DL (ref 6–8.5)
PROT UR QL STRIP: NEGATIVE
PSA SERPL-MCNC: 0.43 NG/ML (ref 0–4)
RBC # BLD AUTO: 4.51 10*6/MM3 (ref 4.14–5.8)
RBC #/AREA URNS HPF: NORMAL /HPF
SODIUM SERPL-SCNC: 141 MMOL/L (ref 136–145)
SP GR UR: 1.01 (ref 1–1.03)
TRIGL SERPL-MCNC: 117 MG/DL (ref 0–150)
TSH SERPL DL<=0.005 MIU/L-ACNC: 1.24 UIU/ML (ref 0.27–4.2)
UROBILINOGEN UR STRIP-MCNC: NORMAL MG/DL
VLDLC SERPL CALC-MCNC: 21 MG/DL (ref 5–40)
WBC # BLD AUTO: 5.43 10*3/MM3 (ref 3.4–10.8)
WBC #/AREA URNS HPF: NORMAL /HPF

## 2021-04-20 ENCOUNTER — IMMUNIZATION (OUTPATIENT)
Dept: VACCINE CLINIC | Facility: HOSPITAL | Age: 51
End: 2021-04-20

## 2021-04-20 ENCOUNTER — APPOINTMENT (OUTPATIENT)
Dept: VACCINE CLINIC | Facility: HOSPITAL | Age: 51
End: 2021-04-20

## 2021-04-20 PROCEDURE — 91300 HC SARSCOV02 VAC 30MCG/0.3ML IM: CPT | Performed by: INTERNAL MEDICINE

## 2021-04-20 PROCEDURE — 0002A: CPT | Performed by: INTERNAL MEDICINE

## 2021-04-23 ENCOUNTER — OFFICE VISIT (OUTPATIENT)
Dept: FAMILY MEDICINE CLINIC | Facility: CLINIC | Age: 51
End: 2021-04-23

## 2021-04-23 VITALS
SYSTOLIC BLOOD PRESSURE: 110 MMHG | HEART RATE: 60 BPM | TEMPERATURE: 97.5 F | DIASTOLIC BLOOD PRESSURE: 64 MMHG | WEIGHT: 197 LBS | BODY MASS INDEX: 25.28 KG/M2 | OXYGEN SATURATION: 99 % | HEIGHT: 74 IN | RESPIRATION RATE: 18 BRPM

## 2021-04-23 DIAGNOSIS — F41.8 SITUATIONAL ANXIETY: ICD-10-CM

## 2021-04-23 DIAGNOSIS — Z00.00 ROUTINE HEALTH MAINTENANCE: ICD-10-CM

## 2021-04-23 DIAGNOSIS — Z12.11 COLON CANCER SCREENING: Primary | ICD-10-CM

## 2021-04-23 PROCEDURE — 99396 PREV VISIT EST AGE 40-64: CPT | Performed by: INTERNAL MEDICINE

## 2021-04-23 NOTE — PROGRESS NOTES
Subjective   Navin Mensah is a 51 y.o. male. Patient is here today for   Chief Complaint   Patient presents with   • Annual Exam          Vitals:    04/23/21 1056   BP: 110/64   Pulse: 60   Resp: 18   Temp: 97.5 °F (36.4 °C)   SpO2: 99%     Body mass index is 25.29 kg/m².      Past Medical History:   Diagnosis Date   • Anxiety       No Known Allergies   Social History     Socioeconomic History   • Marital status:      Spouse name: Not on file   • Number of children: Not on file   • Years of education: Not on file   • Highest education level: Not on file   Tobacco Use   • Smoking status: Never Smoker   • Smokeless tobacco: Never Used   Vaping Use   • Vaping Use: Never used   Substance and Sexual Activity   • Alcohol use: Yes     Alcohol/week: 2.0 standard drinks     Types: 2 Cans of beer per week     Comment: social   • Drug use: Never        Current Outpatient Medications:   •  propranolol (INDERAL) 10 MG tablet, Take 1 tablet by mouth Daily., Disp: 90 tablet, Rfl: 1     Objective     He is here for a comprehensive physical exam.    He actually has no complaints.    He tells me that he feels well.  His wife is seeing a counselor and he thought he might begin to see a counselor to.  With his past Covid pandemic year he has occasionally had some difficulty with social isolation and anxiety.    Work is going well for him.  His 15-year-old son's health is good.  He and his wife are getting along well.       Review of Systems   Constitutional: Negative.    HENT: Negative.    Eyes: Negative.    Respiratory: Negative.    Cardiovascular: Negative.    Gastrointestinal: Negative.    Endocrine: Negative.    Genitourinary: Negative.    Musculoskeletal: Negative.    Skin: Negative.    Allergic/Immunologic: Negative.    Neurological: Negative.    Hematological: Negative.    Psychiatric/Behavioral: Negative.        Physical Exam  Vitals and nursing note reviewed.   Constitutional:       General: He is not in acute  distress.     Appearance: Normal appearance. He is not ill-appearing, toxic-appearing or diaphoretic.      Comments: Pleasant, neatly groomed, no distress.   HENT:      Head: Normocephalic and atraumatic.      Right Ear: Tympanic membrane, ear canal and external ear normal. There is no impacted cerumen.      Left Ear: Tympanic membrane, ear canal and external ear normal. There is no impacted cerumen.   Eyes:      General: No scleral icterus.        Right eye: No discharge.         Left eye: No discharge.      Extraocular Movements: Extraocular movements intact.      Conjunctiva/sclera: Conjunctivae normal.   Neck:      Vascular: No carotid bruit.   Cardiovascular:      Rate and Rhythm: Normal rate and regular rhythm.      Pulses: Normal pulses.      Heart sounds: Normal heart sounds. No murmur heard.   No gallop.    Pulmonary:      Effort: No respiratory distress.      Breath sounds: No stridor. No wheezing, rhonchi or rales.   Chest:      Chest wall: No tenderness.   Abdominal:      General: There is no distension.      Palpations: There is no mass.      Tenderness: There is no abdominal tenderness. There is no right CVA tenderness, left CVA tenderness, guarding or rebound.      Hernia: No hernia is present.   Genitourinary:     Penis: Normal.       Testes: Normal.      Prostate: Normal.      Rectum: Normal.   Musculoskeletal:         General: No swelling, tenderness, deformity or signs of injury. Normal range of motion.      Cervical back: Normal range of motion and neck supple. No rigidity or tenderness.      Right lower leg: No edema.      Left lower leg: No edema.   Lymphadenopathy:      Cervical: No cervical adenopathy.   Skin:     Capillary Refill: Capillary refill takes less than 2 seconds.      Coloration: Skin is not jaundiced or pale.      Findings: No bruising, erythema, lesion or rash.   Neurological:      General: No focal deficit present.      Mental Status: He is alert and oriented to person, place,  and time.      Cranial Nerves: No cranial nerve deficit.      Motor: No weakness.      Coordination: Coordination normal.      Gait: Gait normal.      Deep Tendon Reflexes: Reflexes normal.   Psychiatric:         Mood and Affect: Mood normal.         Behavior: Behavior normal.         Thought Content: Thought content normal.       Neither an EKG nor a PA and lateral chest x-ray were done today.  They were not indicated.    Problems Addressed this Visit        Health Encounters    Routine health maintenance       Mental Health    Situational anxiety      Other Visit Diagnoses     Colon cancer screening    -  Primary    Relevant Orders    Cologuard - Stool, Per Rectum      Diagnoses       Codes Comments    Colon cancer screening    -  Primary ICD-10-CM: Z12.11  ICD-9-CM: V76.51     Routine health maintenance     ICD-10-CM: Z00.00  ICD-9-CM: V70.0     Situational anxiety     ICD-10-CM: F41.8  ICD-9-CM: 300.09             PLAN  He and I reviewed his labs.  His cholesterol a couple years ago was actually too high and now it falls into the normal range.    He is exercising regularly 30 minutes to 40 minutes a day 5 times or more week with a stationary bike.    He takes Inderal 10 mg daily as needed situational anxiety this works out well for him.    Weight is appropriate, his blood pressure is normal.    I have arranged for him to get a Cologuard for colon cancer screening.    I would like to see him back in a year.  No follow-ups on file.

## 2021-07-20 ENCOUNTER — OFFICE VISIT (OUTPATIENT)
Dept: ORTHOPEDIC SURGERY | Facility: CLINIC | Age: 51
End: 2021-07-20

## 2021-07-20 VITALS — WEIGHT: 199.6 LBS | HEIGHT: 74 IN | BODY MASS INDEX: 25.62 KG/M2 | TEMPERATURE: 97.5 F

## 2021-07-20 DIAGNOSIS — R52 PAIN: Primary | ICD-10-CM

## 2021-07-20 DIAGNOSIS — M25.551 RIGHT HIP PAIN: ICD-10-CM

## 2021-07-20 PROCEDURE — 99203 OFFICE O/P NEW LOW 30 MIN: CPT | Performed by: NURSE PRACTITIONER

## 2021-07-20 PROCEDURE — 73501 X-RAY EXAM HIP UNI 1 VIEW: CPT | Performed by: NURSE PRACTITIONER

## 2021-08-13 RX ORDER — MELOXICAM 15 MG/1
TABLET ORAL
Qty: 30 TABLET | Refills: 1 | Status: SHIPPED | OUTPATIENT
Start: 2021-08-13 | End: 2022-02-14

## 2022-02-14 RX ORDER — MELOXICAM 15 MG/1
TABLET ORAL
Qty: 30 TABLET | Refills: 1 | Status: SHIPPED | OUTPATIENT
Start: 2022-02-14

## 2022-02-14 NOTE — TELEPHONE ENCOUNTER
Please notify the patient that I will refill this prescription, however if he is going to be on this medication for long-term he needs to get it filled by his family doctor going forward.  He needs routine lab work every 6 months while on chronic NSAIDs.

## 2023-10-17 ENCOUNTER — EMERGENCY (EMERGENCY)
Facility: HOSPITAL | Age: 53
LOS: 1 days | Discharge: HOME | End: 2023-10-17
Attending: EMERGENCY MEDICINE | Admitting: EMERGENCY MEDICINE
Payer: COMMERCIAL

## 2023-10-17 VITALS
DIASTOLIC BLOOD PRESSURE: 67 MMHG | TEMPERATURE: 98 F | HEIGHT: 73 IN | HEART RATE: 76 BPM | SYSTOLIC BLOOD PRESSURE: 116 MMHG | OXYGEN SATURATION: 97 % | RESPIRATION RATE: 16 BRPM | WEIGHT: 205.03 LBS

## 2023-10-17 DIAGNOSIS — R07.89 OTHER CHEST PAIN: ICD-10-CM

## 2023-10-17 DIAGNOSIS — F41.9 ANXIETY DISORDER, UNSPECIFIED: ICD-10-CM

## 2023-10-17 LAB
ANION GAP SERPL CALC-SCNC: 12 MMOL/L — SIGNIFICANT CHANGE UP (ref 5–17)
ANION GAP SERPL CALC-SCNC: 12 MMOL/L — SIGNIFICANT CHANGE UP (ref 5–17)
BASOPHILS # BLD AUTO: 0.05 K/UL — SIGNIFICANT CHANGE UP (ref 0–0.2)
BASOPHILS # BLD AUTO: 0.05 K/UL — SIGNIFICANT CHANGE UP (ref 0–0.2)
BASOPHILS NFR BLD AUTO: 0.8 % — SIGNIFICANT CHANGE UP (ref 0–2)
BASOPHILS NFR BLD AUTO: 0.8 % — SIGNIFICANT CHANGE UP (ref 0–2)
BUN SERPL-MCNC: 15 MG/DL — SIGNIFICANT CHANGE UP (ref 7–23)
BUN SERPL-MCNC: 15 MG/DL — SIGNIFICANT CHANGE UP (ref 7–23)
CALCIUM SERPL-MCNC: 9.1 MG/DL — SIGNIFICANT CHANGE UP (ref 8.4–10.5)
CALCIUM SERPL-MCNC: 9.1 MG/DL — SIGNIFICANT CHANGE UP (ref 8.4–10.5)
CHLORIDE SERPL-SCNC: 104 MMOL/L — SIGNIFICANT CHANGE UP (ref 96–108)
CHLORIDE SERPL-SCNC: 104 MMOL/L — SIGNIFICANT CHANGE UP (ref 96–108)
CO2 SERPL-SCNC: 25 MMOL/L — SIGNIFICANT CHANGE UP (ref 22–31)
CO2 SERPL-SCNC: 25 MMOL/L — SIGNIFICANT CHANGE UP (ref 22–31)
CREAT SERPL-MCNC: 1 MG/DL — SIGNIFICANT CHANGE UP (ref 0.5–1.3)
CREAT SERPL-MCNC: 1 MG/DL — SIGNIFICANT CHANGE UP (ref 0.5–1.3)
EGFR: 91 ML/MIN/1.73M2 — SIGNIFICANT CHANGE UP
EGFR: 91 ML/MIN/1.73M2 — SIGNIFICANT CHANGE UP
EOSINOPHIL # BLD AUTO: 0.07 K/UL — SIGNIFICANT CHANGE UP (ref 0–0.5)
EOSINOPHIL # BLD AUTO: 0.07 K/UL — SIGNIFICANT CHANGE UP (ref 0–0.5)
EOSINOPHIL NFR BLD AUTO: 1.1 % — SIGNIFICANT CHANGE UP (ref 0–6)
EOSINOPHIL NFR BLD AUTO: 1.1 % — SIGNIFICANT CHANGE UP (ref 0–6)
GLUCOSE SERPL-MCNC: 87 MG/DL — SIGNIFICANT CHANGE UP (ref 70–99)
GLUCOSE SERPL-MCNC: 87 MG/DL — SIGNIFICANT CHANGE UP (ref 70–99)
HCT VFR BLD CALC: 40.9 % — SIGNIFICANT CHANGE UP (ref 39–50)
HCT VFR BLD CALC: 40.9 % — SIGNIFICANT CHANGE UP (ref 39–50)
HGB BLD-MCNC: 14.3 G/DL — SIGNIFICANT CHANGE UP (ref 13–17)
HGB BLD-MCNC: 14.3 G/DL — SIGNIFICANT CHANGE UP (ref 13–17)
IMM GRANULOCYTES NFR BLD AUTO: 0.3 % — SIGNIFICANT CHANGE UP (ref 0–0.9)
IMM GRANULOCYTES NFR BLD AUTO: 0.3 % — SIGNIFICANT CHANGE UP (ref 0–0.9)
LYMPHOCYTES # BLD AUTO: 1.74 K/UL — SIGNIFICANT CHANGE UP (ref 1–3.3)
LYMPHOCYTES # BLD AUTO: 1.74 K/UL — SIGNIFICANT CHANGE UP (ref 1–3.3)
LYMPHOCYTES # BLD AUTO: 27.8 % — SIGNIFICANT CHANGE UP (ref 13–44)
LYMPHOCYTES # BLD AUTO: 27.8 % — SIGNIFICANT CHANGE UP (ref 13–44)
MCHC RBC-ENTMCNC: 31.3 PG — SIGNIFICANT CHANGE UP (ref 27–34)
MCHC RBC-ENTMCNC: 31.3 PG — SIGNIFICANT CHANGE UP (ref 27–34)
MCHC RBC-ENTMCNC: 35 GM/DL — SIGNIFICANT CHANGE UP (ref 32–36)
MCHC RBC-ENTMCNC: 35 GM/DL — SIGNIFICANT CHANGE UP (ref 32–36)
MCV RBC AUTO: 89.5 FL — SIGNIFICANT CHANGE UP (ref 80–100)
MCV RBC AUTO: 89.5 FL — SIGNIFICANT CHANGE UP (ref 80–100)
MONOCYTES # BLD AUTO: 0.61 K/UL — SIGNIFICANT CHANGE UP (ref 0–0.9)
MONOCYTES # BLD AUTO: 0.61 K/UL — SIGNIFICANT CHANGE UP (ref 0–0.9)
MONOCYTES NFR BLD AUTO: 9.7 % — SIGNIFICANT CHANGE UP (ref 2–14)
MONOCYTES NFR BLD AUTO: 9.7 % — SIGNIFICANT CHANGE UP (ref 2–14)
NEUTROPHILS # BLD AUTO: 3.77 K/UL — SIGNIFICANT CHANGE UP (ref 1.8–7.4)
NEUTROPHILS # BLD AUTO: 3.77 K/UL — SIGNIFICANT CHANGE UP (ref 1.8–7.4)
NEUTROPHILS NFR BLD AUTO: 60.3 % — SIGNIFICANT CHANGE UP (ref 43–77)
NEUTROPHILS NFR BLD AUTO: 60.3 % — SIGNIFICANT CHANGE UP (ref 43–77)
NRBC # BLD: 0 /100 WBCS — SIGNIFICANT CHANGE UP (ref 0–0)
NRBC # BLD: 0 /100 WBCS — SIGNIFICANT CHANGE UP (ref 0–0)
PLATELET # BLD AUTO: 215 K/UL — SIGNIFICANT CHANGE UP (ref 150–400)
PLATELET # BLD AUTO: 215 K/UL — SIGNIFICANT CHANGE UP (ref 150–400)
POTASSIUM SERPL-MCNC: 3.8 MMOL/L — SIGNIFICANT CHANGE UP (ref 3.5–5.3)
POTASSIUM SERPL-MCNC: 3.8 MMOL/L — SIGNIFICANT CHANGE UP (ref 3.5–5.3)
POTASSIUM SERPL-SCNC: 3.8 MMOL/L — SIGNIFICANT CHANGE UP (ref 3.5–5.3)
POTASSIUM SERPL-SCNC: 3.8 MMOL/L — SIGNIFICANT CHANGE UP (ref 3.5–5.3)
RBC # BLD: 4.57 M/UL — SIGNIFICANT CHANGE UP (ref 4.2–5.8)
RBC # BLD: 4.57 M/UL — SIGNIFICANT CHANGE UP (ref 4.2–5.8)
RBC # FLD: 13.7 % — SIGNIFICANT CHANGE UP (ref 10.3–14.5)
RBC # FLD: 13.7 % — SIGNIFICANT CHANGE UP (ref 10.3–14.5)
SODIUM SERPL-SCNC: 141 MMOL/L — SIGNIFICANT CHANGE UP (ref 135–145)
SODIUM SERPL-SCNC: 141 MMOL/L — SIGNIFICANT CHANGE UP (ref 135–145)
TROPONIN T, HIGH SENSITIVITY RESULT: <6 NG/L — SIGNIFICANT CHANGE UP (ref 0–51)
TROPONIN T, HIGH SENSITIVITY RESULT: <6 NG/L — SIGNIFICANT CHANGE UP (ref 0–51)
WBC # BLD: 6.26 K/UL — SIGNIFICANT CHANGE UP (ref 3.8–10.5)
WBC # BLD: 6.26 K/UL — SIGNIFICANT CHANGE UP (ref 3.8–10.5)
WBC # FLD AUTO: 6.26 K/UL — SIGNIFICANT CHANGE UP (ref 3.8–10.5)
WBC # FLD AUTO: 6.26 K/UL — SIGNIFICANT CHANGE UP (ref 3.8–10.5)

## 2023-10-17 PROCEDURE — 84484 ASSAY OF TROPONIN QUANT: CPT

## 2023-10-17 PROCEDURE — 36415 COLL VENOUS BLD VENIPUNCTURE: CPT

## 2023-10-17 PROCEDURE — 71045 X-RAY EXAM CHEST 1 VIEW: CPT

## 2023-10-17 PROCEDURE — 71045 X-RAY EXAM CHEST 1 VIEW: CPT | Mod: 26

## 2023-10-17 PROCEDURE — 99285 EMERGENCY DEPT VISIT HI MDM: CPT

## 2023-10-17 PROCEDURE — 99285 EMERGENCY DEPT VISIT HI MDM: CPT | Mod: 25

## 2023-10-17 PROCEDURE — 93010 ELECTROCARDIOGRAM REPORT: CPT

## 2023-10-17 PROCEDURE — 85025 COMPLETE CBC W/AUTO DIFF WBC: CPT

## 2023-10-17 PROCEDURE — 80048 BASIC METABOLIC PNL TOTAL CA: CPT

## 2023-10-17 PROCEDURE — 93005 ELECTROCARDIOGRAM TRACING: CPT

## 2023-10-17 NOTE — ED ADULT TRIAGE NOTE - CHIEF COMPLAINT QUOTE
Pt presents with c/o 3 episodes of "strange sensation" to left ACW today, each lasting "only a second". Denies any associated s/s. Pain free at time of arrival.

## 2023-10-17 NOTE — ED ADULT NURSE NOTE - OBJECTIVE STATEMENT
pt c/o 3 episodes of chest pain starting 3pm today, each lasting briefly. pt states pain is worst at left anterior chest. denies pain at this time. also denies weakness, sob, nausea, headache. pt aox4.

## 2023-10-17 NOTE — ED PROVIDER NOTE - CLINICAL SUMMARY MEDICAL DECISION MAKING FREE TEXT BOX
53-year-old male with intermittent left-sided chest pain radiating to left shoulder nonreproducible on exam last episode 1 hour ago lasted less than 1 second low risk otherwise heart score 1 plan for evaluation for atypical ACS     plan for EKG CBC CMP cardiac enzymes x2 chest x-ray and reassessment   EKG normal sinus rhythm 75 normal axis and intervals no STEMI nonspecific T wave inversion

## 2023-10-17 NOTE — ED ADULT TRIAGE NOTE - SPO2 (%)
Pt called in stating that she needs surgical clearance for a upcoming surgery lumbar back surgery.  Please follow up 97

## 2023-10-17 NOTE — ED PROVIDER NOTE - NSFOLLOWUPCLINICS_GEN_ALL_ED_FT
Cardiology at [facility]  Cardiology  [address] [place] [address], [address] [place] [address]  Phone: [phone]  Fax:

## 2023-10-17 NOTE — ED PROVIDER NOTE - PATIENT PORTAL LINK FT
You can access the FollowMyHealth Patient Portal offered by [facility] by registering at the following website: [email or web address]. By joining [facility]’s FollowMyHealth portal, you will also be able to view your health information using other applications (apps) compatible with our system.

## 2023-10-17 NOTE — ED ADULT TRIAGE NOTE - GLASGOW COMA SCALE: BEST VERBAL RESPONSE, MLM
(V5) oriented Ear Star Wedge Flap Text: The defect edges were debeveled with a #15 blade scalpel.  Given the location of the defect and the proximity to free margins (helical rim) an ear star wedge flap was deemed most appropriate.  Using a sterile surgical marker, the appropriate flap was drawn incorporating the defect and placing the expected incisions between the helical rim and antihelix where possible.  The area thus outlined was incised through and through with a #15 scalpel blade.

## 2023-10-17 NOTE — ED ADULT NURSE NOTE - NSFALLUNIVINTERV_ED_ALL_ED
Bed/Stretcher in lowest position, wheels locked, appropriate side rails in place/Call bell, personal items and telephone in reach/Instruct patient to call for assistance before getting out of bed/chair/stretcher/Non-slip footwear applied when patient is off stretcher/[place] to call system/Physically safe environment - no spills, clutter or unnecessary equipment/Purposeful proactive rounding/Room/bathroom lighting operational, light cord in reach

## 2023-10-17 NOTE — ED PROVIDER NOTE - PROGRESS NOTE DETAILS
Second troponin negative, pt without recurrence of pain will give cardiology follow up and return precautions Second troponin negative, pt without recurrence of pain during course of ED stay, will give cardiology follow up and return precautions. Pt is from out of town, will follow up w/ cardiology when he returns home.

## 2023-10-17 NOTE — ED PROVIDER NOTE - OBJECTIVE STATEMENT
53-year-old male with past medical history of anxiety on propranolol here today with an episode of left-sided dull chest ache which radiated into his left shoulder.  Patient states that he has had 3 such episodes the last one being 1 hour ago while waiting in the ED states the last less than 1 second and since subsided.  Denies associated nausea lightheadedness or shortness of breath.  States that he is physically active and never has chest pain when he is active.  Initial episode started tonight at 6 PM after walking 10 blocks in the city.  Has not had prior stress testing.  Has had prior Holter monitor for palpitations which was normal.

## 2023-10-17 NOTE — ED PROVIDER NOTE - PHYSICAL EXAMINATION
VITAL SIGNS: I have reviewed nursing notes and confirm.  CONSTITUTIONAL: Well appearing, in no acute distress.   SKIN:  warm and dry, no acute rash.   HEAD:  normocephalic, atraumatic.  EYES: EOM intact; conjunctiva and sclera clear.  ENT: No nasal discharge; airway clear.   NECK: Supple.  CARD: S1, S2, Regular rate and rhythm.  distal pulses intact and equal radial bilaterally  RESP:  Clear to auscultation b/l, no wheezes, rales or rhonchi.  ABD: Normal bowel sounds; soft; non-distended; non-tender; no guarding/ rebound.  EXT: Normal ROM. No peripheral edema. Pulses intact and equal b/l.  NEURO: Alert, oriented, grossly unremarkable  PSYCH: Cooperative, mood and affect appropriate.    Musculoskeletal: Nonreproducible full range of motion to left shoulder

## 2023-10-18 VITALS
RESPIRATION RATE: 17 BRPM | TEMPERATURE: 98 F | DIASTOLIC BLOOD PRESSURE: 73 MMHG | SYSTOLIC BLOOD PRESSURE: 125 MMHG | OXYGEN SATURATION: 97 % | HEART RATE: 63 BPM

## 2023-10-18 LAB
TROPONIN T, HIGH SENSITIVITY RESULT: <6 NG/L — SIGNIFICANT CHANGE UP (ref 0–51)
TROPONIN T, HIGH SENSITIVITY RESULT: <6 NG/L — SIGNIFICANT CHANGE UP (ref 0–51)

## 2024-01-08 ENCOUNTER — TELEPHONE (OUTPATIENT)
Dept: CARDIOLOGY | Facility: CLINIC | Age: 54
End: 2024-01-08
Payer: COMMERCIAL

## 2024-01-08 NOTE — TELEPHONE ENCOUNTER
Caller: Navin Mensah III    Relationship to patient: Self    Best call back number: [phone]    Chief complaint: PT WAS ADMITTED TO [facility] IN [place] , FOR 6 DAYS BECAUSE OF ATRIAL FLUTTER. HE HAD 2 CARDIOVERSION'S AND THEY PUT HIM ON AMIODARONE 400 MG DAILY. HE IS SCHEDULED FOR 01.22.24 BUT WOULD LIKE TO GET IN ASAP. PT WAS PLACED ON THE WAIT LIST.     Type of visit: NEW VISIT    Requested date: ASAP    If rescheduling, when is the original appointment: 01.22.24    Additional notes:PT WAS RELEASED FROM THE HOSPITAL ON 01.04.24 PT ALSO HAD OPEN HEART SURGERY ON 12.06.23 FOR AN AORTIC ANEURYSM. THE HOSPITAL BELIEVES THAT HIS OPEN HEART SURGERY IS RELATED TO HIS ATRIAL FLUTTERS. PLEASE REACH OUT TO PT.

## 2024-01-08 NOTE — TELEPHONE ENCOUNTER
See below. Are there any physician providers who can see him before 1/22/24? If not I will ask Dr. Irvin about adding him on a non clinic day.    Let me know when you get a chance.    Thanks,  Margot

## 2024-01-09 NOTE — TELEPHONE ENCOUNTER
That looks like the best that we can do.  You can see if Navin or think he would be willing to see him for his arrhythmia earlier.  Postop atrial fibrillation/flutter.

## 2024-01-09 NOTE — TELEPHONE ENCOUNTER
See below.    He will be a new pt to us for a second opinion. He wants to see you only but also be seen sooner than 1/22/24.     We don't have any openings unless we see him on a cath lab day or at either end of our normal clinic days.    Look over his records and let me know what you think.    Thanks,  Margot

## 2024-01-10 NOTE — TELEPHONE ENCOUNTER
It might be like asking for a miracle, but do you guys have anything sooner than 1/22/24 for a New Patient appt. He is needing to be seen for post op Afib/Aflutter.     If not we will just have to tell the lencho that the soonest we can do is that date.    Thanks,  Margot

## 2024-01-18 ENCOUNTER — TELEPHONE (OUTPATIENT)
Dept: CARDIOLOGY | Facility: CLINIC | Age: 54
End: 2024-01-18
Payer: COMMERCIAL

## 2024-01-18 NOTE — TELEPHONE ENCOUNTER
"  Caller: PRASHANTH    Relationship: SELF    Best call back number: [phone]    What is the best time to reach you: ANY      What was the call regarding:  PT HAD AN ECHO COMPLETED AT THE [facility]  ON 1/17/24- THE NOTES FROM THE VISIT ARE IN CARE EVERYWHERE BUT HE MENTION [facility] TOLD HIM THE IMAGES CAN BE RETRIEVED THROUGH \"DR. NAJERA\"      HE WAS JUST REACHING OUT TO LET DR. CORDERO KNOW AS HIS VISIT IS SCHEDULED ON 1/22/24  "

## 2024-01-22 ENCOUNTER — OFFICE VISIT (OUTPATIENT)
Age: 54
End: 2024-01-22
Payer: COMMERCIAL

## 2024-01-22 VITALS
DIASTOLIC BLOOD PRESSURE: 64 MMHG | SYSTOLIC BLOOD PRESSURE: 120 MMHG | BODY MASS INDEX: 24.9 KG/M2 | HEART RATE: 56 BPM | HEIGHT: 74 IN | WEIGHT: 194 LBS

## 2024-01-22 DIAGNOSIS — I48.92 ATRIAL FLUTTER, PAROXYSMAL: ICD-10-CM

## 2024-01-22 DIAGNOSIS — Z86.79 H/O AORTIC ANEURYSM REPAIR: Primary | ICD-10-CM

## 2024-01-22 DIAGNOSIS — Z98.890 H/O AORTIC ANEURYSM REPAIR: Primary | ICD-10-CM

## 2024-01-22 PROCEDURE — 99204 OFFICE O/P NEW MOD 45 MIN: CPT | Performed by: INTERNAL MEDICINE

## 2024-01-22 PROCEDURE — 93000 ELECTROCARDIOGRAM COMPLETE: CPT | Performed by: INTERNAL MEDICINE

## 2024-01-22 RX ORDER — ASPIRIN 81 MG/1
81 TABLET ORAL DAILY
COMMUNITY

## 2024-01-22 RX ORDER — ATORVASTATIN CALCIUM 20 MG/1
20 TABLET, FILM COATED ORAL
COMMUNITY

## 2024-01-22 RX ORDER — AMIODARONE HYDROCHLORIDE 200 MG/1
200 TABLET ORAL DAILY
COMMUNITY
Start: 2024-01-04 | End: 2024-02-13

## 2024-01-22 RX ORDER — METOPROLOL SUCCINATE 25 MG/1
25 TABLET, EXTENDED RELEASE ORAL 2 TIMES DAILY
COMMUNITY

## 2024-01-22 NOTE — PROGRESS NOTES
Navin Mensah  1970  Date of Office Visit: 01/22/24  Encounter Provider: Sergio Irvin MD  Place of Service: [facility] CARDIOLOGY      CHIEF COMPLAINT:  Ascending aortic aneurysm  Valve sparing aortic root replacement  Postoperative atrial flutter    HISTORY OF PRESENT ILLNESS:  54 yo male with a medical history of ascending aortic aneurysm with valve sparing root replacement at the [facility] 12/6/2023. He was then admitted to [facility] on 12/29/2023 with atrial flutter with RVR. He underwent TY and DC cardioversion to NSR on admission and was discharged on amiodarone on 12/30/2023. [facility] with recurrent atrial flutter. He was seen by cardiology and EP cardiology who recommended the pt undergo ablation. He preferred to wait and consider the ablation so he eventually underwent repeat cardioversion with restoration to NSR.     He has maintained sinus rhythm since that time.  He denies any chest pain or dyspnea on exertion.  His blood pressure and heart rate are well-controlled.  He did have an echo on 1/17/2024 and they felt that his aortic valve regurgitation was more towards moderate on that.  No orthopnea or PND reported.    Review of Systems   Constitutional: Negative for fever and malaise/fatigue.   HENT:  Negative for nosebleeds and sore throat.    Eyes:  Negative for blurred vision and double vision.   Cardiovascular:  Negative for chest pain, claudication, palpitations and syncope.   Respiratory:  Negative for cough, shortness of breath and snoring.    Endocrine: Negative for cold intolerance, heat intolerance and polydipsia.   Skin:  Negative for itching, poor wound healing and rash.   Musculoskeletal:  Negative for joint pain, joint swelling, muscle weakness and myalgias.   Gastrointestinal:  Negative for abdominal pain, melena, nausea and vomiting.   Neurological:  Negative for light-headedness, loss of balance,  "seizures, vertigo and weakness.   Psychiatric/Behavioral:  Negative for altered mental status and depression.        Past Medical History:   Diagnosis Date    Anxiety     Aortic regurgitation     Bicuspid aortic valve     Hyperlipidemia     Sleep apnea        The following portions of the patient's history were reviewed and updated as appropriate: Social history , Family history, and Surgical history     Current Outpatient Medications on File Prior to Visit   Medication Sig Dispense Refill    amiodarone (PACERONE) 200 MG tablet Take 1 tablet by mouth Daily.      apixaban (ELIQUIS) 5 MG tablet tablet Take 1 tablet by mouth 2 (Two) Times a Day.      aspirin 81 MG EC tablet Take 1 tablet by mouth Daily.      atorvastatin (LIPITOR) 20 MG tablet Take 1 tablet by mouth every night at bedtime.      metoprolol succinate XL (TOPROL-XL) 25 MG 24 hr tablet Take 1 tablet by mouth 2 (Two) Times a Day.      [DISCONTINUED] meloxicam (MOBIC) 15 MG tablet TAKE 1 TABLET BY MOUTH EVERY DAY WITH FOOD 30 tablet 1    [DISCONTINUED] propranolol (INDERAL) 10 MG tablet Take 1 tablet by mouth Daily. 90 tablet 1     No current facility-administered medications on file prior to visit.       No Known Allergies    Vitals:    01/22/24 1142   BP: 120/64   Pulse: 56   Weight: 88 kg (194 lb)   Height: 188 cm (74\")     Body mass index is 24.91 kg/m².   Constitutional:       Appearance: Well-developed.   Eyes:      General: No scleral icterus.     Conjunctiva/sclera: Conjunctivae normal.   HENT:      Head: Normocephalic and atraumatic.   Neck:      Thyroid: No thyromegaly.      Vascular: Normal carotid pulses. No carotid bruit, hepatojugular reflux or JVD.      Trachea: No tracheal deviation.   Pulmonary:      Effort: No respiratory distress.      Breath sounds: Normal breath sounds. No decreased breath sounds. No wheezing. No rhonchi. No rales.   Chest:      Chest wall: Not tender to palpatation.   Cardiovascular:      Normal rate. Regular rhythm.    "   No gallop.    Pulses:     Carotid: 2+ bilaterally.     Radial: 2+ bilaterally.     Femoral: 2+ bilaterally.     Dorsalis pedis: 2+ bilaterally.     Posterior tibial: 2+ bilaterally.  Edema:     Peripheral edema absent.   Abdominal:      General: Bowel sounds are normal. There is no distension.      Palpations: Abdomen is soft.      Tenderness: There is no abdominal tenderness.   Musculoskeletal:         General: No deformity.      Cervical back: Normal range of motion and neck supple. Skin:     Findings: No erythema or rash.      Comments: Sternotomy   Neurological:      Mental Status: Alert and oriented to person, place, and time.      Sensory: No sensory deficit.   Psychiatric:         Behavior: Behavior normal.         Lab Results   Component Value Date    WBC 9.52 01/04/2024    HGB 11.2 (L) 01/04/2024    HCT 34.4 (L) 01/04/2024    MCV 91.0 01/04/2024     01/04/2024       Lab Results   Component Value Date    GLUCOSE 88 04/14/2021    BUN 17 08/18/2022    CREATININE 0.89 08/18/2022    BCR 18.9 08/18/2022    K 4.0 01/01/2024    CO2 27 08/18/2022    CALCIUM 9.3 08/18/2022    PROTENTOTREF 6.5 04/14/2021    ALBUMIN 4.6 08/18/2022    BILITOT 0.9 08/18/2022    AST 19 08/18/2022    ALT 18 08/18/2022       Lab Results   Component Value Date    GLUCOSE 88 04/14/2021    CALCIUM 9.3 08/18/2022     08/18/2022    K 4.0 01/01/2024    CO2 27 08/18/2022     08/18/2022    BUN 17 08/18/2022    CREATININE 0.89 08/18/2022    BCR 18.9 08/18/2022    ANIONGAP 8 08/18/2022       Lab Results   Component Value Date    CHLPL 186 04/14/2021    TRIG 117 04/14/2021    HDL 48 04/14/2021     (H) 04/14/2021         ECG 12 Lead    Date/Time: 1/22/2024 12:09 PM  Performed by: Sergio Irvin MD    Authorized by: Sergio Irivn MD  Comparison: not compared with previous ECG   Previous ECG: no previous ECG available  Rhythm: sinus rhythm  Rate: normal  QRS axis: normal           1/17/2024  Exam indication:  Ao Aneurysm Repair   - The left ventricle is normal in size. Left ventricular systolic function is normal. EF = 56 ± 5% (2D biplane)   - The right ventricle is normal in size. Right ventricular systolic function is normal.   - Tricuspid aortic valve. S/P aortic valve resuspension. There is moderate (2+) aortic valve regurgitation. The peak gradient is 12 mmHg, the mean gradient is 7 mmHg and the dimensionless valve index is 0.67. Jet originates anteriorly. Prominent in short axis view (clip #29).     - Exam was compared with the prior  echocardiographic exam performed on12/08/2023. AI appears more prominent in apical views today, may have been   underestimated on prior (a more promient jet was evident in the parasternal views).     12/29/23  1.  Mild biatrial enlargement evidence of interatrial septal aneurysm with small PFO and mild right to left shunting with saline study   2.  Aortic valve resuspension appears stable trileaflet valve there is a mild posterior directed insufficiency jet no evidence of stenosis limited   views of the aortic root and proximal ascending aorta appear within normal caliber   3. No obvious blood flow stasis or left atrial appendage thrombus   Conclusion: Acceptable transesophageal echo for attempted cardioversion.      Cardioversion was performed utilizing 100 J of biphasic synchronized energy of note patient had received a dose of Eliquis prior to procedure and 5 mg, he was additionally given 5000 units of IV heparin.   Conclusion: Successful cardioversion from atrial flutter to normal sinus   rhythm       12/29/23  Normal left ventricular size with concentric remodeling of the left ventricle.   Normal left ventricular function. Ejection fraction 60-65%.   Normal right ventricular size and function.   Mild biatrial enlargement.   S/p aortic valve resuspension after aortic surgery. Mild aortic regurgitation.   S/p 30 Hemashield graft tied over a 21 Hegar dilator. Aortic root dimension  within normal limits. No dilatation of the ascending aorta.       12/6/2023: Valve sparing root reimplantation (#30 Hemashield graft tied over a 21 Hegar dilator)    11/8/2023  Procedural findings:   Left main: No significant luminal disease   LAD: No significant luminal disease   Left circumflex: No significant luminal disease   RCA: Dominant, no significant valve disease     LVEDP is 15 mmHg with no significant radial pullback     DISCUSSION/SUMMARY  Very pleasant 53-year-old male with a medical history of ascending aortic aneurysm, valve sparing root replacement with a 30 mm Hemashield graft, previously documented residual mild aortic valve regurgitation which now appears to be moderate in severity along with postoperative atrial flutter status post direct-current cardioversion who presents to me as a transfer of care.  He is maintaining sinus rhythm currently.  He denies any recent issues with palpitations or tachycardia.  No bleeding complications on his anticoagulant    1.  Ascending aortic aneurysm: Status post valve sparing root with a 30 mm Hemashield graft.  - Follow-up imaging shows the graft looks fine.  The last echo was reported to have moderate aortic valve regurgitation and I have requested those images.  Will continue to follow this clinically and he will need a repeat echo initially in 1 year.    2.  Paroxysmal atrial flutter: Postoperative.  I would hold off on ablation.  I would recommend continuing the direct oral anticoagulant but he can come off the amiodarone therapy.  If he has recurrence of atrial flutter I would recommend ablation at that time.  He will come off the Eliquis when he follows up in about a month and a half assuming that he has no recurrence of his atrial flutter.

## 2024-02-01 ENCOUNTER — TRANSCRIBE ORDERS (OUTPATIENT)
Dept: CARDIAC REHAB | Facility: HOSPITAL | Age: 54
End: 2024-02-01
Payer: COMMERCIAL

## 2024-02-09 ENCOUNTER — OFFICE VISIT (OUTPATIENT)
Dept: CARDIAC REHAB | Facility: HOSPITAL | Age: 54
End: 2024-02-09
Payer: COMMERCIAL

## 2024-02-09 DIAGNOSIS — Z98.890 S/P AORTIC ANEURYSM REPAIR: Primary | ICD-10-CM

## 2024-02-09 DIAGNOSIS — Z86.79 S/P AORTIC ANEURYSM REPAIR: Primary | ICD-10-CM

## 2024-02-09 PROCEDURE — 93798 PHYS/QHP OP CAR RHAB W/ECG: CPT

## 2024-02-12 ENCOUNTER — TREATMENT (OUTPATIENT)
Dept: CARDIAC REHAB | Facility: HOSPITAL | Age: 54
End: 2024-02-12
Payer: COMMERCIAL

## 2024-02-12 DIAGNOSIS — Z98.890 S/P AORTIC ANEURYSM REPAIR: Primary | ICD-10-CM

## 2024-02-12 DIAGNOSIS — Z86.79 S/P AORTIC ANEURYSM REPAIR: Primary | ICD-10-CM

## 2024-02-12 PROCEDURE — 93798 PHYS/QHP OP CAR RHAB W/ECG: CPT

## 2024-02-14 ENCOUNTER — TREATMENT (OUTPATIENT)
Dept: CARDIAC REHAB | Facility: HOSPITAL | Age: 54
End: 2024-02-14
Payer: COMMERCIAL

## 2024-02-14 DIAGNOSIS — Z98.890 S/P AORTIC ANEURYSM REPAIR: Primary | ICD-10-CM

## 2024-02-14 DIAGNOSIS — Z86.79 S/P AORTIC ANEURYSM REPAIR: Primary | ICD-10-CM

## 2024-02-14 PROCEDURE — 93798 PHYS/QHP OP CAR RHAB W/ECG: CPT

## 2024-02-16 ENCOUNTER — TREATMENT (OUTPATIENT)
Dept: CARDIAC REHAB | Facility: HOSPITAL | Age: 54
End: 2024-02-16
Payer: COMMERCIAL

## 2024-02-16 DIAGNOSIS — Z98.890 S/P AORTIC ANEURYSM REPAIR: Primary | ICD-10-CM

## 2024-02-16 DIAGNOSIS — Z86.79 S/P AORTIC ANEURYSM REPAIR: Primary | ICD-10-CM

## 2024-02-16 PROCEDURE — 93798 PHYS/QHP OP CAR RHAB W/ECG: CPT

## 2024-02-19 ENCOUNTER — APPOINTMENT (OUTPATIENT)
Dept: CARDIAC REHAB | Facility: HOSPITAL | Age: 54
End: 2024-02-19
Payer: COMMERCIAL

## 2024-02-21 ENCOUNTER — TREATMENT (OUTPATIENT)
Dept: CARDIAC REHAB | Facility: HOSPITAL | Age: 54
End: 2024-02-21
Payer: COMMERCIAL

## 2024-02-21 DIAGNOSIS — Z86.79 S/P AORTIC ANEURYSM REPAIR: Primary | ICD-10-CM

## 2024-02-21 DIAGNOSIS — Z98.890 S/P AORTIC ANEURYSM REPAIR: Primary | ICD-10-CM

## 2024-02-21 PROCEDURE — 93798 PHYS/QHP OP CAR RHAB W/ECG: CPT

## 2024-02-23 ENCOUNTER — APPOINTMENT (OUTPATIENT)
Dept: CARDIAC REHAB | Facility: HOSPITAL | Age: 54
End: 2024-02-23
Payer: COMMERCIAL

## 2024-02-26 ENCOUNTER — APPOINTMENT (OUTPATIENT)
Dept: CARDIAC REHAB | Facility: HOSPITAL | Age: 54
End: 2024-02-26
Payer: COMMERCIAL

## 2024-02-28 ENCOUNTER — TREATMENT (OUTPATIENT)
Dept: CARDIAC REHAB | Facility: HOSPITAL | Age: 54
End: 2024-02-28
Payer: COMMERCIAL

## 2024-02-28 DIAGNOSIS — Z86.79 S/P AORTIC ANEURYSM REPAIR: Primary | ICD-10-CM

## 2024-02-28 DIAGNOSIS — Z98.890 S/P AORTIC ANEURYSM REPAIR: Primary | ICD-10-CM

## 2024-02-28 PROCEDURE — 93797 PHYS/QHP OP CAR RHAB WO ECG: CPT

## 2024-02-28 PROCEDURE — 93798 PHYS/QHP OP CAR RHAB W/ECG: CPT

## 2024-03-01 ENCOUNTER — TREATMENT (OUTPATIENT)
Dept: CARDIAC REHAB | Facility: HOSPITAL | Age: 54
End: 2024-03-01
Payer: COMMERCIAL

## 2024-03-01 DIAGNOSIS — Z86.79 S/P AORTIC ANEURYSM REPAIR: Primary | ICD-10-CM

## 2024-03-01 DIAGNOSIS — Z98.890 S/P AORTIC ANEURYSM REPAIR: Primary | ICD-10-CM

## 2024-03-01 PROCEDURE — 93798 PHYS/QHP OP CAR RHAB W/ECG: CPT

## 2024-03-04 ENCOUNTER — TREATMENT (OUTPATIENT)
Dept: CARDIAC REHAB | Facility: HOSPITAL | Age: 54
End: 2024-03-04
Payer: COMMERCIAL

## 2024-03-04 DIAGNOSIS — Z86.79 S/P AORTIC ANEURYSM REPAIR: Primary | ICD-10-CM

## 2024-03-04 DIAGNOSIS — Z98.890 S/P AORTIC ANEURYSM REPAIR: Primary | ICD-10-CM

## 2024-03-04 PROCEDURE — 93798 PHYS/QHP OP CAR RHAB W/ECG: CPT

## 2024-03-06 ENCOUNTER — APPOINTMENT (OUTPATIENT)
Dept: CARDIAC REHAB | Facility: HOSPITAL | Age: 54
End: 2024-03-06
Payer: COMMERCIAL

## 2024-03-08 ENCOUNTER — TREATMENT (OUTPATIENT)
Dept: CARDIAC REHAB | Facility: HOSPITAL | Age: 54
End: 2024-03-08
Payer: COMMERCIAL

## 2024-03-08 DIAGNOSIS — Z86.79 S/P AORTIC ANEURYSM REPAIR: Primary | ICD-10-CM

## 2024-03-08 DIAGNOSIS — Z98.890 S/P AORTIC ANEURYSM REPAIR: Primary | ICD-10-CM

## 2024-03-08 PROCEDURE — 93798 PHYS/QHP OP CAR RHAB W/ECG: CPT

## 2024-03-11 ENCOUNTER — TREATMENT (OUTPATIENT)
Dept: CARDIAC REHAB | Facility: HOSPITAL | Age: 54
End: 2024-03-11
Payer: COMMERCIAL

## 2024-03-11 ENCOUNTER — PATIENT MESSAGE (OUTPATIENT)
Age: 54
End: 2024-03-11
Payer: COMMERCIAL

## 2024-03-11 DIAGNOSIS — Z98.890 S/P AORTIC ANEURYSM REPAIR: Primary | ICD-10-CM

## 2024-03-11 DIAGNOSIS — Z86.79 S/P AORTIC ANEURYSM REPAIR: Primary | ICD-10-CM

## 2024-03-11 PROCEDURE — 93798 PHYS/QHP OP CAR RHAB W/ECG: CPT

## 2024-03-12 RX ORDER — ATORVASTATIN CALCIUM 20 MG/1
20 TABLET, FILM COATED ORAL
Qty: 90 TABLET | Refills: 4 | Status: SHIPPED | OUTPATIENT
Start: 2024-03-12

## 2024-03-12 RX ORDER — METOPROLOL SUCCINATE 25 MG/1
25 TABLET, EXTENDED RELEASE ORAL 2 TIMES DAILY
Qty: 180 TABLET | Refills: 4 | Status: SHIPPED | OUTPATIENT
Start: 2024-03-12

## 2024-03-12 RX ORDER — ASPIRIN 81 MG/1
81 TABLET ORAL DAILY
Qty: 90 TABLET | Refills: 4 | Status: SHIPPED | OUTPATIENT
Start: 2024-03-12

## 2024-03-13 ENCOUNTER — TREATMENT (OUTPATIENT)
Dept: CARDIAC REHAB | Facility: HOSPITAL | Age: 54
End: 2024-03-13
Payer: COMMERCIAL

## 2024-03-13 DIAGNOSIS — Z86.79 S/P AORTIC ANEURYSM REPAIR: Primary | ICD-10-CM

## 2024-03-13 DIAGNOSIS — Z98.890 S/P AORTIC ANEURYSM REPAIR: Primary | ICD-10-CM

## 2024-03-13 PROCEDURE — 93798 PHYS/QHP OP CAR RHAB W/ECG: CPT

## 2024-03-15 ENCOUNTER — APPOINTMENT (OUTPATIENT)
Dept: CARDIAC REHAB | Facility: HOSPITAL | Age: 54
End: 2024-03-15
Payer: COMMERCIAL

## 2024-03-18 ENCOUNTER — APPOINTMENT (OUTPATIENT)
Dept: CARDIAC REHAB | Facility: HOSPITAL | Age: 54
End: 2024-03-18
Payer: COMMERCIAL

## 2024-03-20 ENCOUNTER — APPOINTMENT (OUTPATIENT)
Dept: CARDIAC REHAB | Facility: HOSPITAL | Age: 54
End: 2024-03-20
Payer: COMMERCIAL

## 2024-03-22 ENCOUNTER — APPOINTMENT (OUTPATIENT)
Dept: CARDIAC REHAB | Facility: HOSPITAL | Age: 54
End: 2024-03-22
Payer: COMMERCIAL

## 2024-03-25 ENCOUNTER — APPOINTMENT (OUTPATIENT)
Dept: CARDIAC REHAB | Facility: HOSPITAL | Age: 54
End: 2024-03-25
Payer: COMMERCIAL

## 2024-03-25 DIAGNOSIS — I71.21 AORTIC ROOT ANEURYSM: Primary | ICD-10-CM

## 2024-03-27 ENCOUNTER — APPOINTMENT (OUTPATIENT)
Dept: CARDIAC REHAB | Facility: HOSPITAL | Age: 54
End: 2024-03-27
Payer: COMMERCIAL

## 2024-03-29 ENCOUNTER — APPOINTMENT (OUTPATIENT)
Dept: CARDIAC REHAB | Facility: HOSPITAL | Age: 54
End: 2024-03-29
Payer: COMMERCIAL

## 2024-07-08 ENCOUNTER — OFFICE VISIT (OUTPATIENT)
Dept: CARDIOLOGY | Facility: CLINIC | Age: 54
End: 2024-07-08
Payer: COMMERCIAL

## 2024-07-08 VITALS
SYSTOLIC BLOOD PRESSURE: 110 MMHG | HEIGHT: 74 IN | HEART RATE: 54 BPM | BODY MASS INDEX: 24.59 KG/M2 | WEIGHT: 191.6 LBS | DIASTOLIC BLOOD PRESSURE: 72 MMHG

## 2024-07-08 DIAGNOSIS — G47.33 OBSTRUCTIVE SLEEP APNEA HYPOPNEA, MILD: ICD-10-CM

## 2024-07-08 DIAGNOSIS — Z98.890 H/O AORTIC ANEURYSM REPAIR: Primary | ICD-10-CM

## 2024-07-08 DIAGNOSIS — Z86.79 H/O AORTIC ANEURYSM REPAIR: Primary | ICD-10-CM

## 2024-07-08 DIAGNOSIS — I48.92 ATRIAL FLUTTER, PAROXYSMAL: ICD-10-CM

## 2024-07-08 PROCEDURE — 99214 OFFICE O/P EST MOD 30 MIN: CPT | Performed by: NURSE PRACTITIONER

## 2024-07-08 PROCEDURE — 93000 ELECTROCARDIOGRAM COMPLETE: CPT | Performed by: NURSE PRACTITIONER

## 2024-07-08 RX ORDER — METOPROLOL SUCCINATE 25 MG/1
25 TABLET, EXTENDED RELEASE ORAL DAILY
Start: 2024-07-08

## 2024-07-08 NOTE — PROGRESS NOTES
Date of Office Visit: 2024  Encounter Provider: SANTHOSH Middleton  Place of Service: [facility] CARDIOLOGY  Patient Name: Navin Mensah  :1970    No chief complaint on file.  : ascending aortic aneurysm s/p repair    HPI: Navin Mensah is a 54 y.o. male who is a patient of Dr. Irvin.  He presents for 6-month office follow-up.  He has history of ascending aortic aneurysm with valve sparing root replacement at [facility] 2023.  He was then admitted to [facility] on 2023 with atrial flutter with RVR.  He underwent TY and DC cardioversion.  Converted to normal sinus rhythm.  He was discharged on amiodarone and Eliquis.  He was admitted to [facility] on 2024 with recurrent atrial flutter.  Seen by cardiology and EP cardiology recommended patient undergo ablation.  He preferred to wait and consider ablation so he eventually underwent repeat cardioversion with restoration of normal sinus rhythm.    Mr. Mensah maintained sinus rhythm since that time.  An echo on 2024 showed normal LV function and moderate aortic valve regurgitation.  Patient was asymptomatic.  He was seen at [facility] earlier this year.  Amiodarone and Eliquis were stopped.  He was recommended to continue on metoprolol and baby aspirin indefinitely.     Presents today with no new complaints.  He states that a while back, he was working out pretty hard and then helping his son carry quite a bit of heavy things in his woodworking shop.  That night he had some chest pressure.  He followed with his PCP who ordered EKG and some labs.  He was told that he might have some inflammation of the heart muscle and to take ibuprofen on a daily basis for couple weeks.  Patient notes that he had improvement and complete resolution eventually with the ibuprofen.  Denies any further episodes such as that.  He does workout on a normal basis.  Sometimes at night, he feels an  uncomfortable feeling in his chest if he lays on his back.  This is getting better over time.  He noticed over the weekend last weekend his heart rate increased with a few beers and a short amount of time.  Discussed underlying causes of atrial fibrillation and PVCs.  Notes that he was previously diagnosed with mild sleep apnea and prescribed a bite guard which he has not been wearing.      Previous testing and notes have been reviewed by me.   Past Medical History:   Diagnosis Date    Anxiety     Aortic regurgitation     Bicuspid aortic valve     Hyperlipidemia     Sleep apnea        Past Surgical History:   Procedure Laterality Date    AORTIC ROOT REPLACEMENT      CARDIAC CATHETERIZATION  11/08/2023    at [place]       Social History     Socioeconomic History    Marital status:    Tobacco Use    Smoking status: Never    Smokeless tobacco: Never   Vaping Use    Vaping status: Never Used   Substance and Sexual Activity    Alcohol use: Yes     Alcohol/week: 2.0 standard drinks of alcohol     Types: 2 Cans of beer per week     Comment: social    Drug use: Never    Sexual activity: Defer       Family History   Problem Relation Age of Onset    Breast cancer Mother     Melanoma Mother     Aortic stenosis Father     Cancer Father         Bladder       Review of Systems   Constitutional: Negative.   HENT: Negative.     Eyes: Negative.    Cardiovascular: Negative.    Respiratory: Negative.     Endocrine: Negative.    Hematologic/Lymphatic: Negative.    Skin: Negative.    Musculoskeletal: Negative.    Gastrointestinal: Negative.    Genitourinary: Negative.    Neurological: Negative.    Psychiatric/Behavioral: Negative.     Allergic/Immunologic: Negative.        No Known Allergies      Current Outpatient Medications:     aspirin 81 MG EC tablet, Take 1 tablet by mouth Daily., Disp: 90 tablet, Rfl: 4    atorvastatin (LIPITOR) 20 MG tablet, Take 1 tablet by mouth every night at bedtime., Disp: 90 tablet, Rfl: 4     "metoprolol succinate XL (TOPROL-XL) 25 MG 24 hr tablet, Take 1 tablet by mouth Daily., Disp: , Rfl:       Objective:     Vitals:    07/08/24 1218   BP: 110/72   Pulse: 54   Weight: 86.9 kg (191 lb 9.6 oz)   Height: 188 cm (74.02\")     Body mass index is 24.59 kg/m².    1/17/2024  Exam indication: Ao Aneurysm Repair   - The left ventricle is normal in size. Left ventricular systolic function is normal. EF = 56 ± 5% (2D biplane)   - The right ventricle is normal in size. Right ventricular systolic function is normal.   - Tricuspid aortic valve. S/P aortic valve resuspension. There is moderate (2+) aortic valve regurgitation. The peak gradient is 12 mmHg, the mean gradient is 7 mmHg and the dimensionless valve index is 0.67. Jet originates anteriorly. Prominent in short axis view (clip #29).     - Exam was compared with the prior  echocardiographic exam performed on12/08/2023. AI appears more prominent in apical views today, may have been   underestimated on prior (a more promient jet was evident in the parasternal views).      12/29/23  1.  Mild biatrial enlargement evidence of interatrial septal aneurysm with small PFO and mild right to left shunting with saline study   2.  Aortic valve resuspension appears stable trileaflet valve there is a mild posterior directed insufficiency jet no evidence of stenosis limited   views of the aortic root and proximal ascending aorta appear within normal caliber   3. No obvious blood flow stasis or left atrial appendage thrombus   Conclusion: Acceptable transesophageal echo for attempted cardioversion.      Cardioversion was performed utilizing 100 J of biphasic synchronized energy of note patient had received a dose of Eliquis prior to procedure and 5 mg, he was additionally given 5000 units of IV heparin.   Conclusion: Successful cardioversion from atrial flutter to normal sinus   rhythm       12/29/23  Normal left ventricular size with concentric remodeling of the left " ventricle.   Normal left ventricular function. Ejection fraction 60-65%.   Normal right ventricular size and function.   Mild biatrial enlargement.   S/p aortic valve resuspension after aortic surgery. Mild aortic regurgitation.   S/p 30 Hemashield graft tied over a 21 Hegar dilator. Aortic root dimension within normal limits. No dilatation of the ascending aorta.       12/6/2023: Valve sparing root reimplantation (#30 Hemashield graft tied over a 21 Hegar dilator)     11/8/2023  Procedural findings:   Left main: No significant luminal disease   LAD: No significant luminal disease   Left circumflex: No significant luminal disease   RCA: Dominant, no significant valve disease     LVEDP is 15 mmHg with no significant radial pullback     PHYSICAL EXAM:    Constitutional:       Appearance: Healthy appearance. Not in distress.   Neck:      Vascular: No JVR. JVD normal.   Pulmonary:      Effort: Pulmonary effort is normal.      Breath sounds: Normal breath sounds. No wheezing. No rhonchi. No rales.   Chest:      Chest wall: Not tender to palpatation.   Cardiovascular:      PMI at left midclavicular line. Normal rate. Regular rhythm. Normal S1. Normal S2.       Murmurs: There is no murmur.      No gallop.  No click. No rub.   Pulses:     Intact distal pulses.   Edema:     Peripheral edema absent.   Abdominal:      General: Bowel sounds are normal.      Palpations: Abdomen is soft.      Tenderness: There is no abdominal tenderness.   Musculoskeletal: Normal range of motion.         General: No tenderness. Skin:     General: Skin is warm and dry.   Neurological:      General: No focal deficit present.      Mental Status: Alert and oriented to person, place and time.           ECG 12 Lead    Date/Time: 7/8/2024 1:30 PM  Performed by: Debbie Hightower APRN    Authorized by: Debbie Hightower APRN  Comparison: compared with previous ECG from 1/22/2024  Similar to previous ECG  Rhythm: sinus rhythm  BPM: 54  T Waves: T  waves normal  QRS axis: normal            Assessment:       Diagnosis Plan   1. H/O aortic aneurysm repair  Adult Transthoracic Echo Complete W/ Cont if Necessary Per Protocol      2. Atrial flutter, paroxysmal        3. Obstructive sleep apnea hypopnea, mild          Orders Placed This Encounter   Procedures    Adult Transthoracic Echo Complete W/ Cont if Necessary Per Protocol     Standing Status:   Future     Standing Expiration Date:   7/8/2025     Order Specific Question:   Reason for exam?     Answer:   Valvular Function     Order Specific Question:   Release to patient     Answer:   Routine Release [[id number]]          Plan:       Ascending aortic aneurysm: Status post valve sparing root with 30 mm Heema shield graft at [facility].  Follow-up imaging looks good.  Most recent echocardiogram shows moderate aortic valve regurgitation.  Repeat echo prior to next office appointment in 6 months.    Postoperative atrial flutter: Postoperative.  Status post 2 cardioversions.  Amiodarone and Eliquis stopped in March due to no recurrence.  On aspirin and metoprolol.  Patient had noticed his heart rate was on the low side.  Decrease metoprolol succinate to 1 time daily.  Sinus rhythm on EKG today.  Test underlying causes have atrial fibrillation/flutter and PVCs.  Recommend patient to listen to body, stay hydrated and watch alcohol intake.  Also commend for him to start wearing his bite guard as prescribed.    3.  Mild obstructive sleep apnea: Previously diagnosed.  Has bite guard he will start wearing    Mr. Mensah will follow-up with Dr. Irvin in 6 months.  He will have an echocardiogram prior to that office appointment.       Your medication list            Accurate as of July 8, 2024  1:30 PM. If you have any questions, ask your nurse or doctor.                CHANGE how you take these medications        Instructions Last Dose Given Next Dose Due   metoprolol succinate XL 25 MG 24 hr tablet  Commonly known  as: TOPROL-XL  What changed: when to take this  Changed by: SANTHOSH Middleton      Take 1 tablet by mouth Daily.              CONTINUE taking these medications        Instructions Last Dose Given Next Dose Due   aspirin 81 MG EC tablet      Take 1 tablet by mouth Daily.       atorvastatin 20 MG tablet  Commonly known as: LIPITOR      Take 1 tablet by mouth every night at bedtime.              STOP taking these medications      apixaban 5 MG tablet tablet  Commonly known as: ELIQUIS  Stopped by: SANTHOSH Middleton                  Where to Get Your Medications        Information about where to get these medications is not yet available    Ask your nurse or doctor about these medications  metoprolol succinate XL 25 MG 24 hr tablet           As always, it has been a pleasure to participate in your patient's care.      Sincerely,       SANTHOSH Sheldon

## 2025-01-03 ENCOUNTER — TELEPHONE (OUTPATIENT)
Dept: CARDIOLOGY | Facility: CLINIC | Age: 55
End: 2025-01-03

## 2025-01-03 RX ORDER — AMOXICILLIN 500 MG/1
2000 CAPSULE ORAL SEE ADMIN INSTRUCTIONS
Qty: 12 CAPSULE | Refills: 0 | Status: SHIPPED | OUTPATIENT
Start: 2025-01-03

## 2025-01-03 NOTE — TELEPHONE ENCOUNTER
Caller: PRASHANTH    Relationship: SELF    Best call back number: [phone]      What was the call regarding: PT WAS ASKED TO GET AN ANTIBIOTIC FROM DR. CORDERO FOR HIS DENTAL CLEANING ON 1/10/25      [id number] - [place], [place] - [address] AT [facility] - [phone]  - [phone] FX   [address] [facility] [address]   Phone: [phone] Fax: [phone]   Hours: Not open 24 hours

## 2025-01-09 ENCOUNTER — TELEPHONE (OUTPATIENT)
Dept: CARDIOLOGY | Facility: CLINIC | Age: 55
End: 2025-01-09
Payer: COMMERCIAL

## 2025-01-09 ENCOUNTER — HOSPITAL ENCOUNTER (OUTPATIENT)
Dept: CARDIOLOGY | Facility: HOSPITAL | Age: 55
Discharge: HOME | End: 2025-01-09
Admitting: NURSE PRACTITIONER
Payer: COMMERCIAL

## 2025-01-09 ENCOUNTER — OFFICE VISIT (OUTPATIENT)
Age: 55
End: 2025-01-09
Payer: COMMERCIAL

## 2025-01-09 VITALS
HEART RATE: 50 BPM | SYSTOLIC BLOOD PRESSURE: 108 MMHG | DIASTOLIC BLOOD PRESSURE: 70 MMHG | BODY MASS INDEX: 23.74 KG/M2 | WEIGHT: 185 LBS | HEIGHT: 74 IN

## 2025-01-09 VITALS
WEIGHT: 185 LBS | HEIGHT: 74 IN | DIASTOLIC BLOOD PRESSURE: 70 MMHG | BODY MASS INDEX: 23.74 KG/M2 | SYSTOLIC BLOOD PRESSURE: 108 MMHG

## 2025-01-09 DIAGNOSIS — I48.92 ATRIAL FLUTTER, PAROXYSMAL: Primary | ICD-10-CM

## 2025-01-09 DIAGNOSIS — Z98.890 H/O AORTIC ANEURYSM REPAIR: ICD-10-CM

## 2025-01-09 DIAGNOSIS — Z86.79 H/O AORTIC ANEURYSM REPAIR: ICD-10-CM

## 2025-01-09 LAB
AORTIC ARCH: 2.8 CM
ASCENDING AORTA: 2.9 CM
AV MEAN PRESS GRAD SYS DOP V1V2: 4.6 MMHG
AV VMAX SYS DOP: 146.6 CM/SEC
BH CV ECHO MEAS - ACS: 1.34 CM
BH CV ECHO MEAS - AI P1/2T: 729.6 MSEC
BH CV ECHO MEAS - AO MAX PG: 8.6 MMHG
BH CV ECHO MEAS - AO ROOT DIAM: 3 CM
BH CV ECHO MEAS - AO V2 VTI: 33.1 CM
BH CV ECHO MEAS - AVA(I,D): 1.72 CM2
BH CV ECHO MEAS - EDV(CUBED): 185.6 ML
BH CV ECHO MEAS - EDV(MOD-SP2): 135 ML
BH CV ECHO MEAS - EDV(MOD-SP4): 135 ML
BH CV ECHO MEAS - EF(MOD-SP2): 64.4 %
BH CV ECHO MEAS - EF(MOD-SP4): 60.7 %
BH CV ECHO MEAS - ESV(CUBED): 33 ML
BH CV ECHO MEAS - ESV(MOD-SP2): 48 ML
BH CV ECHO MEAS - ESV(MOD-SP4): 53 ML
BH CV ECHO MEAS - FS: 43.8 %
BH CV ECHO MEAS - IVS/LVPW: 1.15 CM
BH CV ECHO MEAS - IVSD: 0.97 CM
BH CV ECHO MEAS - LAT PEAK E' VEL: 12 CM/SEC
BH CV ECHO MEAS - LV DIASTOLIC VOL/BSA (35-75): 64.2 CM2
BH CV ECHO MEAS - LV MASS(C)D: 200.3 GRAMS
BH CV ECHO MEAS - LV MAX PG: 2.42 MMHG
BH CV ECHO MEAS - LV MEAN PG: 1.23 MMHG
BH CV ECHO MEAS - LV SYSTOLIC VOL/BSA (12-30): 25.2 CM2
BH CV ECHO MEAS - LV V1 MAX: 77.7 CM/SEC
BH CV ECHO MEAS - LV V1 VTI: 19.1 CM
BH CV ECHO MEAS - LVIDD: 5.7 CM
BH CV ECHO MEAS - LVIDS: 3.2 CM
BH CV ECHO MEAS - LVOT AREA: 3 CM2
BH CV ECHO MEAS - LVOT DIAM: 1.95 CM
BH CV ECHO MEAS - LVPWD: 0.84 CM
BH CV ECHO MEAS - MED PEAK E' VEL: 6.1 CM/SEC
BH CV ECHO MEAS - MR MAX PG: 9.1 MMHG
BH CV ECHO MEAS - MR MAX VEL: 150.7 CM/SEC
BH CV ECHO MEAS - MV A DUR: 0.13 SEC
BH CV ECHO MEAS - MV A MAX VEL: 47.7 CM/SEC
BH CV ECHO MEAS - MV DEC SLOPE: 219.9 CM/SEC2
BH CV ECHO MEAS - MV DEC TIME: 0.25 SEC
BH CV ECHO MEAS - MV E MAX VEL: 70.4 CM/SEC
BH CV ECHO MEAS - MV E/A: 1.47
BH CV ECHO MEAS - MV MAX PG: 2.7 MMHG
BH CV ECHO MEAS - MV MEAN PG: 1.15 MMHG
BH CV ECHO MEAS - MV P1/2T: 117.5 MSEC
BH CV ECHO MEAS - MV V2 VTI: 38.5 CM
BH CV ECHO MEAS - MVA(P1/2T): 1.87 CM2
BH CV ECHO MEAS - MVA(VTI): 1.48 CM2
BH CV ECHO MEAS - PA ACC TIME: 0.15 SEC
BH CV ECHO MEAS - PA V2 MAX: 78.7 CM/SEC
BH CV ECHO MEAS - PULM A REVS DUR: 0.15 SEC
BH CV ECHO MEAS - PULM A REVS VEL: 31.4 CM/SEC
BH CV ECHO MEAS - PULM DIAS VEL: 52.4 CM/SEC
BH CV ECHO MEAS - PULM S/D: 0.97
BH CV ECHO MEAS - PULM SYS VEL: 50.9 CM/SEC
BH CV ECHO MEAS - QP/QS: 0.59
BH CV ECHO MEAS - RAP SYSTOLE: 3 MMHG
BH CV ECHO MEAS - RV MAX PG: 1.35 MMHG
BH CV ECHO MEAS - RV V1 MAX: 58.1 CM/SEC
BH CV ECHO MEAS - RV V1 VTI: 12.9 CM
BH CV ECHO MEAS - RVOT DIAM: 1.82 CM
BH CV ECHO MEAS - SUP REN AO DIAM: 2 CM
BH CV ECHO MEAS - SV(LVOT): 57 ML
BH CV ECHO MEAS - SV(MOD-SP2): 87 ML
BH CV ECHO MEAS - SV(MOD-SP4): 82 ML
BH CV ECHO MEAS - SV(RVOT): 33.5 ML
BH CV ECHO MEAS - SVI(LVOT): 27.1 ML/M2
BH CV ECHO MEAS - SVI(MOD-SP2): 41.4 ML/M2
BH CV ECHO MEAS - SVI(MOD-SP4): 39 ML/M2
BH CV ECHO MEAS - TAPSE (>1.6): 1.47 CM
BH CV ECHO MEASUREMENTS AVERAGE E/E' RATIO: 7.78
BH CV XLRA - RV BASE: 3.2 CM
BH CV XLRA - RV LENGTH: 9.1 CM
BH CV XLRA - RV MID: 2.8 CM
BH CV XLRA - TDI S': 7.1 CM/SEC
LEFT ATRIUM VOLUME INDEX: 21.9 ML/M2
LV EF BIPLANE MOD: 64.3 %
STJ: 1.99 CM

## 2025-01-09 PROCEDURE — 99214 OFFICE O/P EST MOD 30 MIN: CPT | Performed by: INTERNAL MEDICINE

## 2025-01-09 PROCEDURE — 93000 ELECTROCARDIOGRAM COMPLETE: CPT | Performed by: INTERNAL MEDICINE

## 2025-01-09 PROCEDURE — 93306 TTE W/DOPPLER COMPLETE: CPT

## 2025-01-09 NOTE — PROGRESS NOTES
Navin Mensah  1970  Date of Office Visit: 01/09/25  Encounter Provider: Sergio Irvin MD  Place of Service: [facility]      CHIEF COMPLAINT:  Ascending aortic aneurysm  Valve sparing aortic root replacement  Postoperative atrial flutter    HISTORY OF PRESENT ILLNESS:  53 yo male with a medical history of ascending aortic aneurysm with valve sparing root replacement at the [facility] 12/6/2023. He was then admitted to [facility] on 12/29/2023 with atrial flutter with RVR. He underwent TY and DC cardioversion to NSR on admission and was discharged on amiodarone on 12/30/2023. [facility] with recurrent atrial flutter. He was seen by cardiology and EP cardiology who recommended the pt undergo ablation. He preferred to wait and consider the ablation so he eventually underwent repeat cardioversion with restoration to NSR.     He has been maintaining sinus rhythm as of late.  Currently sinus bradycardia with a ventricular rate of 50 bpm.  He is taking the metoprolol succinate twice daily.  He seems to be tolerating this well and has a steady level of activity.  He has been skiing recently with no limitation.    Review of Systems   Constitutional: Negative for fever and malaise/fatigue.   HENT:  Negative for nosebleeds and sore throat.    Eyes:  Negative for blurred vision and double vision.   Cardiovascular:  Negative for chest pain, claudication, palpitations and syncope.   Respiratory:  Negative for cough, shortness of breath and snoring.    Endocrine: Negative for cold intolerance, heat intolerance and polydipsia.   Skin:  Negative for itching, poor wound healing and rash.   Musculoskeletal:  Negative for joint pain, joint swelling, muscle weakness and myalgias.   Gastrointestinal:  Negative for abdominal pain, melena, nausea and vomiting.   Neurological:  Negative for light-headedness, loss of balance, seizures, vertigo  "and weakness.   Psychiatric/Behavioral:  Negative for altered mental status and depression.        Past Medical History:   Diagnosis Date    Anxiety     Aortic regurgitation     Bicuspid aortic valve     Hyperlipidemia     Sleep apnea        The following portions of the patient's history were reviewed and updated as appropriate: Social history , Family history, and Surgical history     Current Outpatient Medications on File Prior to Visit   Medication Sig Dispense Refill    amoxicillin (AMOXIL) 500 MG capsule Take 4 capsules by mouth See Admin Instructions. 4 capsules 1 hour prior to procedure 12 capsule 0    aspirin 81 MG EC tablet Take 1 tablet by mouth Daily. 90 tablet 4    atorvastatin (LIPITOR) 20 MG tablet Take 1 tablet by mouth every night at bedtime. 90 tablet 4    metoprolol succinate XL (TOPROL-XL) 25 MG 24 hr tablet Take 1 tablet by mouth Daily. (Patient taking differently: Take 1 tablet by mouth 2 (Two) Times a Day.)       No current facility-administered medications on file prior to visit.       No Known Allergies    Vitals:    01/09/25 0946   BP: 108/70   Pulse: 50   Weight: 83.9 kg (185 lb)   Height: 188 cm (74\")     Body mass index is 23.75 kg/m².   Constitutional:       Appearance: Well-developed.   Eyes:      General: No scleral icterus.     Conjunctiva/sclera: Conjunctivae normal.   HENT:      Head: Normocephalic and atraumatic.   Neck:      Thyroid: No thyromegaly.      Vascular: Normal carotid pulses. No carotid bruit, hepatojugular reflux or JVD.      Trachea: No tracheal deviation.   Pulmonary:      Effort: No respiratory distress.      Breath sounds: Normal breath sounds. No decreased breath sounds. No wheezing. No rhonchi. No rales.   Chest:      Chest wall: Not tender to palpatation.   Cardiovascular:      Normal rate. Regular rhythm.      No gallop.    Pulses:     Carotid: 2+ bilaterally.     Radial: 2+ bilaterally.     Femoral: 2+ bilaterally.     Dorsalis pedis: 2+ bilaterally.     " Posterior tibial: 2+ bilaterally.  Edema:     Peripheral edema absent.   Abdominal:      General: Bowel sounds are normal. There is no distension.      Palpations: Abdomen is soft.      Tenderness: There is no abdominal tenderness.   Musculoskeletal:         General: No deformity.      Cervical back: Normal range of motion and neck supple. Skin:     Findings: No erythema or rash.      Comments: Sternotomy   Neurological:      Mental Status: Alert and oriented to person, place, and time.      Sensory: No sensory deficit.   Psychiatric:         Behavior: Behavior normal.         Lab Results   Component Value Date    WBC 5.86 08/05/2024    HGB 14.9 08/05/2024    HCT 43.7 08/05/2024    MCV 90.5 08/05/2024     08/05/2024       Lab Results   Component Value Date    GLUCOSE 88 04/14/2021    BUN 17 08/18/2022    CREATININE 0.89 08/18/2022    BCR 18.9 08/18/2022    K 4.0 01/01/2024    CO2 27 08/18/2022    CALCIUM 9.3 08/18/2022    PROTENTOTREF 6.5 04/14/2021    ALBUMIN 4.6 08/18/2022    BILITOT 0.9 08/18/2022    AST 19 08/18/2022    ALT 18 08/18/2022       Lab Results   Component Value Date    GLUCOSE 88 04/14/2021    CALCIUM 9.3 08/18/2022     08/18/2022    K 4.0 01/01/2024    CO2 27 08/18/2022     08/18/2022    BUN 17 08/18/2022    CREATININE 0.89 08/18/2022    BCR 18.9 08/18/2022    ANIONGAP 8 08/18/2022       Lab Results   Component Value Date    CHLPL 186 04/14/2021    TRIG 117 04/14/2021    HDL 48 04/14/2021     (H) 04/14/2021         ECG 12 Lead    Date/Time: 1/9/2025 10:13 AM  Performed by: Sergio Irvin MD    Authorized by: Sergio Irvin MD  Comparison: compared with previous ECG from 7/8/2024  Similar to previous ECG  Rhythm: sinus bradycardia           1/17/2024  Exam indication: Ao Aneurysm Repair   - The left ventricle is normal in size. Left ventricular systolic function is normal. EF = 56 ± 5% (2D biplane)   - The right ventricle is normal in size. Right ventricular  systolic function is normal.   - Tricuspid aortic valve. S/P aortic valve resuspension. There is moderate (2+) aortic valve regurgitation. The peak gradient is 12 mmHg, the mean gradient is 7 mmHg and the dimensionless valve index is 0.67. Jet originates anteriorly. Prominent in short axis view (clip #[id number]).     - Exam was compared with the prior  echocardiographic exam performed on12/08/2023. AI appears more prominent in apical views today, may have been   underestimated on prior (a more promient jet was evident in the parasternal views).     12/29/23  1.  Mild biatrial enlargement evidence of interatrial septal aneurysm with small PFO and mild right to left shunting with saline study   2.  Aortic valve resuspension appears stable trileaflet valve there is a mild posterior directed insufficiency jet no evidence of stenosis limited   views of the aortic root and proximal ascending aorta appear within normal caliber   3. No obvious blood flow stasis or left atrial appendage thrombus   Conclusion: Acceptable transesophageal echo for attempted cardioversion.      Cardioversion was performed utilizing 100 J of biphasic synchronized energy of note patient had received a dose of Eliquis prior to procedure and 5 mg, he was additionally given 5000 units of IV heparin.   Conclusion: Successful cardioversion from atrial flutter to normal sinus   rhythm       12/29/23  Normal left ventricular size with concentric remodeling of the left ventricle.   Normal left ventricular function. Ejection fraction 60-65%.   Normal right ventricular size and function.   Mild biatrial enlargement.   S/p aortic valve resuspension after aortic surgery. Mild aortic regurgitation.   S/p 30 Hemashield graft tied over a 21 Hegar dilator. Aortic root dimension within normal limits. No dilatation of the ascending aorta.       12/6/2023: Valve sparing root reimplantation (#30 Hemashield graft tied over a 21 Hegar dilator)    11/8/2023  Procedural  findings:   Left main: No significant luminal disease   LAD: No significant luminal disease   Left circumflex: No significant luminal disease   RCA: Dominant, no significant valve disease     LVEDP is 15 mmHg with no significant radial pullback     DISCUSSION/SUMMARY  54-year-old male with a medical history of ascending aortic aneurysm, valve sparing root replacement with a 30 mm Hemashield graft, previously documented residual mild aortic valve regurgitation which now appears to be moderate in severity along with postoperative atrial flutter status post direct-current cardioversion who presents to me in follow-up.  He is doing very well.  He is maintaining sinus bradycardia.    1.  Ascending aortic aneurysm: Status post valve sparing root with a 30 mm Hemashield graft.  - Follow-up imaging shows the graft looks fine.  Mild to moderate aortic valve regurgitation documented on transthoracic echocardiogram today.  -We will continue to follow him yearly.  Plan on repeat echocardiogram in 2 to 3 years.    2.  Paroxysmal atrial flutter: Postoperative.  No recurrence.

## 2025-05-20 RX ORDER — ASPIRIN 81 MG/1
81 TABLET, COATED ORAL DAILY
Qty: 90 TABLET | Refills: 3 | Status: SHIPPED | OUTPATIENT
Start: 2025-05-20

## 2025-05-28 RX ORDER — ATORVASTATIN CALCIUM 20 MG/1
20 TABLET, FILM COATED ORAL
Qty: 90 TABLET | Refills: 3 | Status: SHIPPED | OUTPATIENT
Start: 2025-05-28